# Patient Record
Sex: MALE | Race: WHITE | Employment: OTHER | URBAN - METROPOLITAN AREA
[De-identification: names, ages, dates, MRNs, and addresses within clinical notes are randomized per-mention and may not be internally consistent; named-entity substitution may affect disease eponyms.]

---

## 2019-03-21 ENCOUNTER — OFFICE VISIT (OUTPATIENT)
Dept: FAMILY MEDICINE CLINIC | Facility: CLINIC | Age: 69
End: 2019-03-21
Payer: MEDICARE

## 2019-03-21 VITALS
TEMPERATURE: 97 F | WEIGHT: 181 LBS | OXYGEN SATURATION: 98 % | BODY MASS INDEX: 29.09 KG/M2 | HEART RATE: 76 BPM | SYSTOLIC BLOOD PRESSURE: 124 MMHG | RESPIRATION RATE: 16 BRPM | DIASTOLIC BLOOD PRESSURE: 80 MMHG | HEIGHT: 66 IN

## 2019-03-21 DIAGNOSIS — R30.0 DYSURIA: Primary | ICD-10-CM

## 2019-03-21 LAB
APPEARANCE: CLEAR
BILIRUBIN: NEGATIVE
GLUCOSE (URINE DIPSTICK): NEGATIVE MG/DL
KETONES (URINE DIPSTICK): NEGATIVE MG/DL
LEUKOCYTES: NEGATIVE
MULTISTIX LOT#: NORMAL NUMERIC
NITRITE, URINE: NEGATIVE
OCCULT BLOOD: NEGATIVE
PH, URINE: 5.5 (ref 4.5–8)
PROTEIN (URINE DIPSTICK): NEGATIVE MG/DL
SPECIFIC GRAVITY: 1.01 (ref 1–1.03)
URINE-COLOR: YELLOW
UROBILINOGEN,SEMI-QN: 0.2 MG/DL (ref 0–1.9)

## 2019-03-21 PROCEDURE — 81003 URINALYSIS AUTO W/O SCOPE: CPT | Performed by: NURSE PRACTITIONER

## 2019-03-21 PROCEDURE — 87086 URINE CULTURE/COLONY COUNT: CPT | Performed by: NURSE PRACTITIONER

## 2019-03-21 PROCEDURE — 99203 OFFICE O/P NEW LOW 30 MIN: CPT | Performed by: NURSE PRACTITIONER

## 2019-03-21 RX ORDER — DILTIAZEM HYDROCHLORIDE EXTENDED-RELEASE TABLETS 180 MG/1
180 TABLET, EXTENDED RELEASE ORAL DAILY
COMMUNITY
End: 2020-02-27 | Stop reason: ALTCHOICE

## 2019-03-21 RX ORDER — FUROSEMIDE 20 MG/1
20 TABLET ORAL 2 TIMES DAILY
COMMUNITY

## 2019-03-21 RX ORDER — CIPROFLOXACIN 500 MG/1
500 TABLET, FILM COATED ORAL 2 TIMES DAILY
Qty: 20 TABLET | Refills: 0 | Status: SHIPPED | OUTPATIENT
Start: 2019-03-21 | End: 2019-03-31

## 2019-03-21 RX ORDER — DIGOXIN 125 MCG
TABLET ORAL DAILY
COMMUNITY
End: 2020-02-27 | Stop reason: ALTCHOICE

## 2019-03-21 NOTE — PROGRESS NOTES
CHIEF COMPLAINT:   Patient presents with:  Burning On Urination: freqeuncy sx x 3-4 days. HPI:   Michel Lyle is a 77year old male who presents with symptoms of UTI. Complaining of urinary frequency, urgency, dysuria for 4 days.   Symptoms have • Essential hypertension    • Hyperlipidemia       Social History:  Social History    Tobacco Use      Smoking status: Former Smoker      Smokeless tobacco: Never Used    Alcohol use: Not on file    Drug use: Not on file        REVIEW OF SYSTEMS:   GENERAL Sig: Take 1 tablet (500 mg total) by mouth 2 (two) times daily for 10 days. Risk and benefits of medication discussed. Stressed importance of completing full course of antibiotic unless told otherwise.      The patient indicates understanding of t · If you were given a prescription medicine, take as directed. Be sure to take it until it is all used up. · If a culture was taken, don't have sex until you have been told that it is negative. This means you don't have an infection.  Then follow your heal © 3294-7146 [employer] [address]. [facility] [place] [facility] [place]. All rights reserved. This information is not intended as a substitute for professional medical care. Always follow your healthcare professional's instructions.

## 2019-03-23 ENCOUNTER — TELEPHONE (OUTPATIENT)
Dept: FAMILY MEDICINE CLINIC | Facility: CLINIC | Age: 69
End: 2019-03-23

## 2019-03-23 NOTE — TELEPHONE ENCOUNTER
Spoke with patient after reviewing Final urine culture results, No growth after 24 hours. Pt instructed to stop rx and make appointment with PCP. Pt agrees to this plan.

## 2019-10-15 ENCOUNTER — WALK IN (OUTPATIENT)
Dept: URGENT CARE | Age: 69
End: 2019-10-15

## 2019-10-15 DIAGNOSIS — Z23 NEEDS FLU SHOT: Primary | ICD-10-CM

## 2019-10-15 PROCEDURE — G0008 ADMIN INFLUENZA VIRUS VAC: HCPCS | Performed by: NURSE PRACTITIONER

## 2019-10-15 PROCEDURE — 90662 IIV NO PRSV INCREASED AG IM: CPT | Performed by: NURSE PRACTITIONER

## 2024-06-26 ENCOUNTER — APPOINTMENT (OUTPATIENT)
Dept: GENERAL RADIOLOGY | Age: 74
End: 2024-06-26
Attending: PHYSICIAN ASSISTANT

## 2024-06-26 ENCOUNTER — HOSPITAL ENCOUNTER (EMERGENCY)
Age: 74
Discharge: HOME | End: 2024-06-26
Attending: EMERGENCY MEDICINE

## 2024-06-26 VITALS
BODY MASS INDEX: 34.53 KG/M2 | OXYGEN SATURATION: 97 % | TEMPERATURE: 98 F | HEIGHT: 67 IN | DIASTOLIC BLOOD PRESSURE: 85 MMHG | HEART RATE: 70 BPM | WEIGHT: 220 LBS | RESPIRATION RATE: 18 BRPM | SYSTOLIC BLOOD PRESSURE: 130 MMHG

## 2024-06-26 DIAGNOSIS — S61.213A LACERATION OF LEFT MIDDLE FINGER WITHOUT FOREIGN BODY WITHOUT DAMAGE TO NAIL, INITIAL ENCOUNTER: ICD-10-CM

## 2024-06-26 DIAGNOSIS — S61.311A LACERATION OF LEFT INDEX FINGER WITHOUT FOREIGN BODY WITH DAMAGE TO NAIL, INITIAL ENCOUNTER: Primary | ICD-10-CM

## 2024-06-26 PROCEDURE — 99283 EMERGENCY DEPT VISIT LOW MDM: CPT

## 2024-06-26 PROCEDURE — 12002 RPR S/N/AX/GEN/TRNK2.6-7.5CM: CPT

## 2024-06-26 PROCEDURE — 73130 X-RAY EXAM OF HAND: CPT | Performed by: PHYSICIAN ASSISTANT

## 2024-06-26 RX ORDER — DONEPEZIL HYDROCHLORIDE 10 MG/1
10 TABLET, FILM COATED ORAL
COMMUNITY
Start: 2023-07-05

## 2024-06-26 NOTE — ED PROVIDER NOTES
Patient Seen in: [facility] In [place]      History     Chief Complaint   Patient presents with    Laceration/Abrasion     Stated Complaint: l finger lac    Subjective:   HPI    Pleasant 73-year-old gentleman.  Right-hand-dominant.  Patient arrives to the ER for evaluation of a left hand laceration.  Occurred just prior to arrival while utilizing hedge tremors.  Sustained injury to the distal pad of the left index and middle finger.  Believes his tetanus is up-to-date.  He is anticoagulated.  Minimal bleeding.  Denies any difficulty with range of motion.    Objective:   Past Medical History:    Arthritis    Atherosclerosis of coronary artery    Atrial fibrillation (HCC)    Cardiac pacemaker in situ    Congestive heart disease (HCC)    Diverticulosis of sigmoid colon    History of hemorrhoids    PPH: 8/14/2002    Hx of colonic polyps    Tubular adenoma, ascending    Hyperlipidemia    Hypertension, essential              Past Surgical History:   Procedure Laterality Date    Cardiac pacemaker placement  08/2019    Carpal tunnel release Left 08/2011    Carpal tunnel release Right 02/2012    Colonoscopy  11/08/2006    Tubular adenoma, sigmoid    Colonoscopy  11/18/2009    Diverticulosis of sigmoid    Colonoscopy  10/05/2016    Tubular adenoma, ascending; diverticulosis of sigmoid    Colonoscopy  07/24/2020    Diverticulosis, sigmoid    Coronary stent placement  07/29/2011    Hemorrhoidectomy  08/14/2002    PPH: stapled hemorrhoidopexy    Inguinal hernia repair Left 06/04/2007    Large PerFix plug and patch    Knee replacement surgery Left 2005    Laparoscopic appendectomy      Other  foot                Social History     Socioeconomic History    Marital status:    Tobacco Use    Smoking status: Former    Smokeless tobacco: Never     Social Determinants of Health     Financial Resource Strain: Low Risk  (6/19/2024)    Received from [facility]    Overall Financial  Resource Strain (CARDIA)     Difficulty of Paying Living Expenses: Not hard at all   Food Insecurity: No Food Insecurity (6/19/2024)    Received from [facility]    Hunger Vital Sign     Worried About Running Out of Food in the Last Year: Never true     Ran Out of Food in the Last Year: Never true   Transportation Needs: No Transportation Needs (6/19/2024)    Received from [facility]    PRAPARE - Transportation     Lack of Transportation (Medical): No     Lack of Transportation (Non-Medical): No   Housing Stability: Low Risk  (6/19/2024)    Received from [facility]    Housing Stability Vital Sign     Unable to Pay for Housing in the Last Year: No     Number of Places Lived in the Last Year: 1     In the last 12 months, was there a time when you did not have a steady place to sleep or slept in a shelter (including now)?: No              Review of Systems    Positive for stated Chief Complaint: Laceration/Abrasion    Other systems are as noted in HPI.  Constitutional and vital signs reviewed.      All other systems reviewed and negative except as noted above.    Physical Exam     ED Triage Vitals [06/26/24 1350]   /85   Pulse 70   Resp 18   Temp 98 °F (36.7 °C)   Temp src    SpO2 97 %   O2 Device        Current Vitals:   Vital Signs  BP: 130/85  Pulse: 70  Resp: 18  Temp: 98 °F (36.7 °C)    Oxygen Therapy  SpO2: 97 %            Physical Exam    Gen: Well appearing, well groomed, alert and aware x 3  Lung: No distress, RR, no retraction  Extremities: Full ROM, no deformity, NVI  Skin: Laceration through the distal pad of the left index finger with nailbed involvement.  Lunula intact.  To the lateral aspect of the distal left middle finger there is a second laceration.  Superficial.  Roughly 1.5 cm.  Does not involve the nailbed  Neuro:  Normal Gait    ED Course   Labs Reviewed - No data to display        XR HAND (MIN 3 VIEWS), LEFT  (QWW=32357)    Result Date: 6/26/2024  PROCEDURE:  XR HAND (MIN 3 VIEWS), LEFT (CPT=73130)  TECHNIQUE:  Three views of the left hand were obtained.  COMPARISON:  None.  INDICATIONS:  l finger lac  PATIENT STATED HISTORY: (As transcribed by Technologist)  Patient has laceration to distal 2nd and 3rd digits on left hand due to  accident today.    FINDINGS:  Osseous structures appear demineralized.  Arthritic changes are seen at the carpal-first metacarpal joint with joint space narrowing, subchondral sclerosis and spurring.  Mild joint space narrowing noted in the PIP and DIP joints of the 2nd through 5th digits and the interphalangeal joint of the thumb.  There is moderate joint space narrowing of the 3rd MCP joint and the 5th MCP joint.  There is radiocarpal joint space narrowing also noted.  Soft tissue irregularity identified at the tip of the index finger.  There is no acute hand fracture, subluxation or dislocation.  No radiopaque foreign body is identified.            CONCLUSION:  No fracture or dislocation.  Soft tissue irregularity noted the tip of the index finger consistent with history of laceration.  Arthritic changes are seen in the hand and wrist.   LOCATION:  [facility]   Dictated by (CST): Rory Stiles MD on 6/26/2024 at 2:17 PM     Finalized by (CST): Rory Stiles MD on 6/26/2024 at 2:19 PM                 MDM        My supervising physician was involved in the management of this patient.    Skin: Laceration through the distal pad of the left index finger with nailbed involvement.  Lunula intact.  To the lateral aspect of the distal left middle finger there is a second laceration.  Superficial.  Roughly 1.5 cm.  Does not involve the nailbed    Concern for possible involvement of the tuft of the index finger.  Plain film x-rays performed.    Minimal depth to the middle finger injury    Verbal consent for the following procedure was obtained.  We discussed the inherent risks of  procedure.  We discussed benefits.  Patient agrees to proceed with the procedure and verbalizes understanding of potential complications.    Using 1% plain lidocaine, local injection performed to both of the index and middle finger.  Both sites were then thoroughly irrigated.  Sterile drape.  Site sterilized.      CONCLUSION:  No fracture or dislocation.  Soft tissue irregularity noted the tip of the index finger consistent with history of laceration.  Arthritic changes are seen in the hand and wrist.   LOCATION:  [facility]   Dictated by (CST): Rory Stiles MD on 6/26/2024 at 2:17 PM     Finalized by (CST): Rory Stiles MD on 6/26/2024 at 2:19 PM        X-ray as above.    To the index finger, using 5-0 nylon, 3 simple interrupted sutures were placed.  Well-approximated.  We discussed trimming the nail as it grows out    To the middle finger, using 5-0 nylon, 3 simple erupted sutures were placed.  Both lacerations 1.5 cm                           Medical Decision Making      Disposition and Plan     Clinical Impression:  1. Laceration of left index finger without foreign body with damage to nail, initial encounter    2. Laceration of left middle finger without foreign body without damage to nail, initial encounter         Disposition:  There is no disposition on file for this visit.  There is no disposition time on file for this visit.    Follow-up:  Cassandra Winter  [address] [place] [id number] [phone]    Follow up            Medications Prescribed:  Current Discharge Medication List

## 2024-08-20 ENCOUNTER — TELEPHONE (OUTPATIENT)
Dept: CARDIOLOGY UNIT | Facility: HOSPITAL | Age: 74
End: 2024-08-20

## 2024-08-20 NOTE — PROGRESS NOTES
Called patient to enquire about angiogram, he is having cath at [facility] on 8/26 w/Dr. Bethea, advised he obtain CD of images at the time of cath, he understands and agrees, will follow up after cath.  Venus Matthews RN  Clinical Coordinator  CV Surgery

## 2024-09-25 RX ORDER — EZETIMIBE 10 MG/1
10 TABLET ORAL NIGHTLY
COMMUNITY

## 2024-09-25 RX ORDER — ATORVASTATIN CALCIUM 80 MG/1
80 TABLET, FILM COATED ORAL NIGHTLY
COMMUNITY

## 2024-09-25 RX ORDER — FLUTICASONE PROPIONATE 50 MCG
1 SPRAY, SUSPENSION (ML) NASAL DAILY PRN
COMMUNITY

## 2024-09-25 RX ORDER — ROPINIROLE 0.5 MG/1
0.5 TABLET, FILM COATED ORAL NIGHTLY
COMMUNITY

## 2024-09-25 RX ORDER — FUROSEMIDE 20 MG/1
10 TABLET ORAL DAILY
COMMUNITY
End: 2024-10-12

## 2024-09-25 RX ORDER — TAMSULOSIN HYDROCHLORIDE 0.4 MG/1
0.4 CAPSULE ORAL NIGHTLY
COMMUNITY

## 2024-09-25 RX ORDER — METOPROLOL TARTRATE 50 MG
50 TABLET ORAL 2 TIMES DAILY
Status: ON HOLD | COMMUNITY
End: 2024-10-12

## 2024-09-25 RX ORDER — LEVOTHYROXINE SODIUM 100 UG/1
100 TABLET ORAL
COMMUNITY

## 2024-09-30 ENCOUNTER — HOSPITAL ENCOUNTER (OUTPATIENT)
Dept: CV DIAGNOSTICS | Facility: HOSPITAL | Age: 74
Discharge: HOME | End: 2024-09-30
Attending: THORACIC SURGERY (CARDIOTHORACIC VASCULAR SURGERY)
Payer: MEDICARE

## 2024-09-30 ENCOUNTER — HOSPITAL ENCOUNTER (OUTPATIENT)
Dept: LAB | Facility: HOSPITAL | Age: 74
Discharge: HOME | End: 2024-09-30
Attending: THORACIC SURGERY (CARDIOTHORACIC VASCULAR SURGERY)
Payer: MEDICARE

## 2024-09-30 ENCOUNTER — HOSPITAL ENCOUNTER (OUTPATIENT)
Dept: INTERVENTIONAL RADIOLOGY/VASCULAR | Facility: HOSPITAL | Age: 74
Discharge: HOME | End: 2024-09-30
Attending: THORACIC SURGERY (CARDIOTHORACIC VASCULAR SURGERY)
Payer: MEDICARE

## 2024-09-30 ENCOUNTER — HOSPITAL ENCOUNTER (OUTPATIENT)
Dept: CARDIOLOGY CLINIC | Facility: HOSPITAL | Age: 74
Discharge: HOME | End: 2024-09-30
Attending: THORACIC SURGERY (CARDIOTHORACIC VASCULAR SURGERY)
Payer: MEDICARE

## 2024-09-30 ENCOUNTER — HOSPITAL ENCOUNTER (OUTPATIENT)
Dept: GENERAL RADIOLOGY | Facility: HOSPITAL | Age: 74
Discharge: HOME | End: 2024-09-30
Attending: THORACIC SURGERY (CARDIOTHORACIC VASCULAR SURGERY)
Payer: MEDICARE

## 2024-09-30 DIAGNOSIS — Z01.818 PRE-OP TESTING: ICD-10-CM

## 2024-09-30 DIAGNOSIS — I48.91 ATRIAL FIBRILLATION, UNSPECIFIED TYPE (HCC): ICD-10-CM

## 2024-09-30 DIAGNOSIS — Z91.89 AT RISK FOR BLEEDING: ICD-10-CM

## 2024-09-30 DIAGNOSIS — I34.0 MITRAL REGURGITATION: ICD-10-CM

## 2024-09-30 LAB
ALBUMIN SERPL-MCNC: 4.1 G/DL (ref 3.2–4.8)
ALBUMIN/GLOB SERPL: 1.6 {RATIO} (ref 1–2)
ALP LIVER SERPL-CCNC: 72 U/L
ALT SERPL-CCNC: 33 U/L
ANION GAP SERPL CALC-SCNC: 4 MMOL/L (ref 0–18)
ANTIBODY SCREEN: NEGATIVE
APTT PPP: 33.4 SECONDS (ref 23–36)
AST SERPL-CCNC: 32 U/L (ref ?–34)
BASOPHILS # BLD AUTO: 0.01 X10(3) UL (ref 0–0.2)
BASOPHILS NFR BLD AUTO: 0.1 %
BILIRUB SERPL-MCNC: 1.7 MG/DL (ref 0.2–1.1)
BILIRUB UR QL STRIP.AUTO: NEGATIVE
BUN BLD-MCNC: 16 MG/DL (ref 9–23)
CALCIUM BLD-MCNC: 9.8 MG/DL (ref 8.7–10.4)
CHLORIDE SERPL-SCNC: 108 MMOL/L (ref 98–112)
CLARITY UR REFRACT.AUTO: CLEAR
CO2 SERPL-SCNC: 26 MMOL/L (ref 21–32)
COLOR UR AUTO: YELLOW
CREAT BLD-MCNC: 0.89 MG/DL
EGFRCR SERPLBLD CKD-EPI 2021: 89 ML/MIN/1.73M2 (ref 60–?)
EOSINOPHIL # BLD AUTO: 0.15 X10(3) UL (ref 0–0.7)
EOSINOPHIL NFR BLD AUTO: 2.2 %
ERYTHROCYTE [DISTWIDTH] IN BLOOD BY AUTOMATED COUNT: 13.6 %
EST. AVERAGE GLUCOSE BLD GHB EST-MCNC: 100 MG/DL (ref 68–126)
GLOBULIN PLAS-MCNC: 2.6 G/DL (ref 2–3.5)
GLUCOSE BLD-MCNC: 90 MG/DL (ref 70–99)
GLUCOSE UR STRIP.AUTO-MCNC: NORMAL MG/DL
HBA1C MFR BLD: 5.1 % (ref ?–5.7)
HCT VFR BLD AUTO: 46.7 %
HGB BLD-MCNC: 16.1 G/DL
IMM GRANULOCYTES # BLD AUTO: 0.01 X10(3) UL (ref 0–1)
IMM GRANULOCYTES NFR BLD: 0.1 %
INR BLD: 1.14 (ref 0.8–1.2)
KETONES UR STRIP.AUTO-MCNC: NEGATIVE MG/DL
LEUKOCYTE ESTERASE UR QL STRIP.AUTO: NEGATIVE
LYMPHOCYTES # BLD AUTO: 1.36 X10(3) UL (ref 1–4)
LYMPHOCYTES NFR BLD AUTO: 20.1 %
MCH RBC QN AUTO: 30.6 PG (ref 26–34)
MCHC RBC AUTO-ENTMCNC: 34.5 G/DL (ref 31–37)
MCV RBC AUTO: 88.8 FL
MONOCYTES # BLD AUTO: 0.61 X10(3) UL (ref 0.1–1)
MONOCYTES NFR BLD AUTO: 9 %
NEUTROPHILS # BLD AUTO: 4.62 X10 (3) UL (ref 1.5–7.7)
NEUTROPHILS # BLD AUTO: 4.62 X10(3) UL (ref 1.5–7.7)
NEUTROPHILS NFR BLD AUTO: 68.5 %
NITRITE UR QL STRIP.AUTO: NEGATIVE
OSMOLALITY SERPL CALC.SUM OF ELEC: 287 MOSM/KG (ref 275–295)
PH UR STRIP.AUTO: 5.5 [PH] (ref 5–8)
PLATELET # BLD AUTO: 204 10(3)UL (ref 150–450)
POTASSIUM SERPL-SCNC: 4.4 MMOL/L (ref 3.5–5.1)
PROT SERPL-MCNC: 6.7 G/DL (ref 5.7–8.2)
PROT UR STRIP.AUTO-MCNC: NEGATIVE MG/DL
PROTHROMBIN TIME: 14.8 SECONDS (ref 11.6–14.8)
RBC # BLD AUTO: 5.26 X10(6)UL
RBC UR QL AUTO: NEGATIVE
RH BLOOD TYPE: POSITIVE
SODIUM SERPL-SCNC: 138 MMOL/L (ref 136–145)
SP GR UR STRIP.AUTO: 1.02 (ref 1–1.03)
UROBILINOGEN UR STRIP.AUTO-MCNC: NORMAL MG/DL
WBC # BLD AUTO: 6.8 X10(3) UL (ref 4–11)

## 2024-09-30 PROCEDURE — 80053 COMPREHEN METABOLIC PANEL: CPT | Performed by: THORACIC SURGERY (CARDIOTHORACIC VASCULAR SURGERY)

## 2024-09-30 PROCEDURE — 86901 BLOOD TYPING SEROLOGIC RH(D): CPT | Performed by: THORACIC SURGERY (CARDIOTHORACIC VASCULAR SURGERY)

## 2024-09-30 PROCEDURE — 85025 COMPLETE CBC W/AUTO DIFF WBC: CPT | Performed by: THORACIC SURGERY (CARDIOTHORACIC VASCULAR SURGERY)

## 2024-09-30 PROCEDURE — 93010 ELECTROCARDIOGRAM REPORT: CPT | Performed by: INTERNAL MEDICINE

## 2024-09-30 PROCEDURE — 71045 X-RAY EXAM CHEST 1 VIEW: CPT | Performed by: THORACIC SURGERY (CARDIOTHORACIC VASCULAR SURGERY)

## 2024-09-30 PROCEDURE — 85730 THROMBOPLASTIN TIME PARTIAL: CPT | Performed by: THORACIC SURGERY (CARDIOTHORACIC VASCULAR SURGERY)

## 2024-09-30 PROCEDURE — 36415 COLL VENOUS BLD VENIPUNCTURE: CPT | Performed by: THORACIC SURGERY (CARDIOTHORACIC VASCULAR SURGERY)

## 2024-09-30 PROCEDURE — 86900 BLOOD TYPING SEROLOGIC ABO: CPT | Performed by: THORACIC SURGERY (CARDIOTHORACIC VASCULAR SURGERY)

## 2024-09-30 PROCEDURE — 81003 URINALYSIS AUTO W/O SCOPE: CPT | Performed by: THORACIC SURGERY (CARDIOTHORACIC VASCULAR SURGERY)

## 2024-09-30 PROCEDURE — 85610 PROTHROMBIN TIME: CPT | Performed by: THORACIC SURGERY (CARDIOTHORACIC VASCULAR SURGERY)

## 2024-09-30 PROCEDURE — 93005 ELECTROCARDIOGRAM TRACING: CPT

## 2024-09-30 PROCEDURE — 86850 RBC ANTIBODY SCREEN: CPT | Performed by: THORACIC SURGERY (CARDIOTHORACIC VASCULAR SURGERY)

## 2024-09-30 PROCEDURE — 83036 HEMOGLOBIN GLYCOSYLATED A1C: CPT | Performed by: THORACIC SURGERY (CARDIOTHORACIC VASCULAR SURGERY)

## 2024-09-30 NOTE — PAT NURSING NOTE
Met with patient in structural heart for PAT visit for upcoming MVR with Dr. Porras on 10/8     PATs completed at visit. Pre surgical instructions reviewed with patient. Tenative time of arrival for surgery 0530, will call patient day before to confirm arrival time. [place] at [facility] registration desk. Park in [place] parking garage. Bring surgical binder. Showering with hibiclens x3, no lotions, powders, ointments. Do not shave below neck. No jewelry. Strict NPO at 2200 night before. Morning of surgery, take only metoprolol with small sip of water. No gum, candy, food, mints, coffee, liquids, etc. STOP all over the counter vitamins, supplements, nsaids, omega 3 one week prior to surgery, take last dose on 9/30. Take last dose of Eliquis on 10/2. Admit average of 5 nights. Intra-op and post op expectations discussed. Roles of pt/ot discussed. Patient and spouse verbalized understanding of all instructions.    Patient has [employer] pacemaker in place.     5 meter walk times     3.89  3.96  3.75

## 2024-09-30 NOTE — PROGRESS NOTES
Met with patient and wife in PAT to discuss pre op teaching, post op expectations, discharge planning.  Discussed roles of CM/SW, PT/OT, Cardiac rehab in education and recovery.  All questions answered, binder given.  Discussed typical post op and at home recovery as well as restrictions, wife will be home to assist, she is a former RN and once worked with Dr. Porras. Cath films received, will review with Dr. Porras.  Pt has chosen TISSUE [facility]VE.  Will follow up post op.  Venus Matthews RN  Clinical Coordinator  CV Surgery

## 2024-09-30 NOTE — CM/SW NOTE
Met with patient per protocol as patient is scheduled to have MVR on 10/8/2024 with Dr Porras to discuss discharge planning.  Patient, who goes by Dell, has been retired from [employer] as a  for the past 20 years.  He resides with his spouse Rupa in a ranch style home in [place] in the [facility].  The couple have been  for 50 years and have 3 grown children who all reside 'locally.'  Dell drives, is independent with ADL's --uses no assistive devices or respiratory DME.  He has not smoked for over 38 years.  As for interests and hobbies, he   09/30/24 1400   CM/SW Referral Data   Referral Source Physician;   Reason for Referral Discharge planning;Protocol order set  (MVR with kimberly on 10/8/2024)   Specify order set Other  (mvr)   Informant Patient;Spouse/Significant Other   Medical Hx   Does patient have an established PCP? Yes   Patient Info   Patient's Current Mental Status at Time of Assessment Alert;Oriented   Patient's Home Environment House   Number of Levels in Home 1   Number of Stair in Home none--ranch   Patient lives with Spouse/Significant other   Patient Status Prior to Admission   Independent with ADLs and Mobility Yes   Discharge Needs   Anticipated D/C needs Home health care      enjoys fishing, playing pool, [employer] ball and playing cards.  PCP is Dr Julio Bueno--[facility]-Dell served in the [employer] [place] for 19 months--has 100% disability benefits; otherwise uses the [facility] for one time prescriptions.      Discussed what to expect day of / post surgery.  Explained how therapies to work with the patient and customize a discharge plan for him.  Talked about the role of HHC @ discharge--either [facility] to be arranged within patient's insurance network--both in agreement of this service being arranged.  Spouse Rupa is a retired 'seasoned' nurse with most recent position as a Director of Nursing @ [employer]--retired just  before COVID.; she will be home with patient so he will not be alone.  All questions addressed / answered.  Emotional support given.  CM/SW to remain available for any additional discharge planning needs

## 2024-10-01 ENCOUNTER — TELEPHONE (OUTPATIENT)
Dept: CARDIOLOGY UNIT | Facility: HOSPITAL | Age: 74
End: 2024-10-01

## 2024-10-01 LAB
ATRIAL RATE: 241 BPM
Q-T INTERVAL: 442 MS
QRS DURATION: 148 MS
QTC CALCULATION (BEZET): 477 MS
R AXIS: -54 DEGREES
T AXIS: 38 DEGREES
VENTRICULAR RATE: 70 BPM

## 2024-10-01 NOTE — PROGRESS NOTES
Reviewed cath films with Dr. Porras, no significant CAD.  Venus Matthews RN  Clinical Coordinator  CV Surgery

## 2024-10-07 NOTE — DISCHARGE INSTRUCTIONS
To access the Dr. Porras discharge instructions video on your home computer:   [email or web address]  Remove steri strips from all incisions on 10/22      Sometimes managing your health at home requires assistance.  [facility] team has recognized your preference to use [facility].  They can be reached by phone at [phone].  The fax number for your reference is [phone].  A representative from the home health agency will contact you or your family to schedule your first visit.     [facility] @ discharge  [phone]    Your orientation appointment for cardiac rehabilitation is 11/7 at 10 am.    Please bring an order for cardiac rehabilitation from your cardiologist.    Please verify your insurance coverage and request an HMO referral if needed prior to your first appointment.    Your appointment will be one hour long.    If you have questions about cardiac rehabilitation, please call [phone].

## 2024-10-08 ENCOUNTER — APPOINTMENT (OUTPATIENT)
Dept: GENERAL RADIOLOGY | Facility: HOSPITAL | Age: 74
End: 2024-10-08
Attending: PHYSICIAN ASSISTANT
Payer: MEDICARE

## 2024-10-08 ENCOUNTER — ANESTHESIA (OUTPATIENT)
Dept: CARDIAC SURGERY | Facility: HOSPITAL | Age: 74
End: 2024-10-08
Payer: MEDICARE

## 2024-10-08 ENCOUNTER — ANESTHESIA EVENT (OUTPATIENT)
Dept: CARDIAC SURGERY | Facility: HOSPITAL | Age: 74
End: 2024-10-08
Payer: MEDICARE

## 2024-10-08 ENCOUNTER — HOSPITAL ENCOUNTER (INPATIENT)
Facility: HOSPITAL | Age: 74
LOS: 4 days | Discharge: HOME HEALTH | End: 2024-10-12
Attending: THORACIC SURGERY (CARDIOTHORACIC VASCULAR SURGERY) | Admitting: THORACIC SURGERY (CARDIOTHORACIC VASCULAR SURGERY)
Payer: MEDICARE

## 2024-10-08 ENCOUNTER — HOSPITAL ENCOUNTER (INPATIENT)
Facility: HOSPITAL | Age: 74
LOS: 4 days | Discharge: HOME | End: 2024-10-12
Attending: THORACIC SURGERY (CARDIOTHORACIC VASCULAR SURGERY) | Admitting: THORACIC SURGERY (CARDIOTHORACIC VASCULAR SURGERY)
Payer: MEDICARE

## 2024-10-08 DIAGNOSIS — I34.0 MITRAL REGURGITATION: Primary | ICD-10-CM

## 2024-10-08 DIAGNOSIS — I48.91 ATRIAL FIBRILLATION, UNSPECIFIED TYPE (HCC): ICD-10-CM

## 2024-10-08 DIAGNOSIS — J90 PLEURAL EFFUSION: ICD-10-CM

## 2024-10-08 DIAGNOSIS — Z91.89 AT RISK FOR BLEEDING: ICD-10-CM

## 2024-10-08 DIAGNOSIS — Z01.818 PRE-OP TESTING: ICD-10-CM

## 2024-10-08 LAB
APTT PPP: 41 SECONDS (ref 23–36)
ATRIAL RATE: 71 BPM
BASE EXCESS BLD CALC-SCNC: -3 MMOL/L
BASE EXCESS BLDA CALC-SCNC: -1.1 MMOL/L (ref ?–2)
BASE EXCESS BLDA CALC-SCNC: -3.8 MMOL/L (ref ?–2)
BODY TEMPERATURE: 98.6 F
BODY TEMPERATURE: 98.6 F
BUN BLD-MCNC: 13 MG/DL (ref 9–23)
CA-I BLD-SCNC: 1.12 MMOL/L (ref 0.95–1.32)
CA-I BLD-SCNC: 1.2 MMOL/L (ref 1.12–1.32)
CA-I BLD-SCNC: 1.3 MMOL/L (ref 0.95–1.32)
CALCIUM BLD-MCNC: 7.9 MG/DL (ref 8.7–10.4)
CHLORIDE SERPL-SCNC: 115 MMOL/L (ref 98–112)
CO2 BLD-SCNC: 25 MMOL/L (ref 22–32)
CO2 SERPL-SCNC: 24 MMOL/L (ref 21–32)
COHGB MFR BLD: 1.5 % SAT (ref 0–3)
COHGB MFR BLD: 1.8 % SAT (ref 0–3)
CREAT BLD-MCNC: 0.79 MG/DL
EGFRCR SERPLBLD CKD-EPI 2021: 92 ML/MIN/1.73M2 (ref 60–?)
ERYTHROCYTE [DISTWIDTH] IN BLOOD BY AUTOMATED COUNT: 13.4 %
FIBRINOGEN PPP-MCNC: 207 MG/DL (ref 200–480)
FIO2: 40 %
FIO2: 60 %
GLUCOSE BLD-MCNC: 117 MG/DL (ref 70–99)
GLUCOSE BLD-MCNC: 128 MG/DL (ref 70–99)
GLUCOSE BLD-MCNC: 129 MG/DL (ref 70–99)
GLUCOSE BLD-MCNC: 132 MG/DL (ref 70–99)
GLUCOSE BLD-MCNC: 132 MG/DL (ref 70–99)
GLUCOSE BLD-MCNC: 133 MG/DL (ref 70–99)
GLUCOSE BLD-MCNC: 136 MG/DL (ref 70–99)
GLUCOSE BLD-MCNC: 137 MG/DL (ref 70–99)
GLUCOSE BLD-MCNC: 142 MG/DL (ref 70–99)
GLUCOSE BLD-MCNC: 143 MG/DL (ref 70–99)
GLUCOSE BLD-MCNC: 144 MG/DL (ref 70–99)
GLUCOSE BLD-MCNC: 145 MG/DL (ref 70–99)
GLUCOSE BLD-MCNC: 147 MG/DL (ref 70–99)
HCO3 BLD-SCNC: 23.5 MEQ/L
HCO3 BLDA-SCNC: 22 MEQ/L (ref 21–27)
HCO3 BLDA-SCNC: 24.1 MEQ/L (ref 21–27)
HCT VFR BLD AUTO: 42.8 %
HCT VFR BLD CALC: 38 %
HGB BLD-MCNC: 12.3 G/DL
HGB BLD-MCNC: 13.7 G/DL
HGB BLD-MCNC: 14.7 G/DL
INR BLD: 1.5 (ref 0.8–1.2)
ISTAT ACTIVATED CLOTTING TIME: 140 SECONDS (ref 74–137)
LACTATE BLD-SCNC: 1 MMOL/L (ref 0.5–2)
LACTATE BLD-SCNC: 1.7 MMOL/L (ref 0.5–2)
MAGNESIUM SERPL-MCNC: 2.2 MG/DL (ref 1.6–2.6)
MCH RBC QN AUTO: 31.1 PG (ref 26–34)
MCHC RBC AUTO-ENTMCNC: 34.3 G/DL (ref 31–37)
MCV RBC AUTO: 90.5 FL
METHGB MFR BLD: 0.6 % SAT (ref 0.4–1.5)
METHGB MFR BLD: 0.6 % SAT (ref 0.4–1.5)
MRSA DNA SPEC QL NAA+PROBE: NEGATIVE
OXYHGB MFR BLDA: 97.6 % (ref 92–100)
OXYHGB MFR BLDA: 97.8 % (ref 92–100)
P AXIS: 74 DEGREES
PCO2 BLD: 46.7 MMHG
PCO2 BLDA: 26 MM HG (ref 35–45)
PCO2 BLDA: 39 MM HG (ref 35–45)
PEEP: 5 CM H2O
PEEP: 5 CM H2O
PH BLD: 7.31 [PH]
PH BLDA: 7.35 [PH] (ref 7.35–7.45)
PH BLDA: 7.51 [PH] (ref 7.35–7.45)
PLATELET # BLD AUTO: 116 10(3)UL (ref 150–450)
PO2 BLD: 197 MMHG
PO2 BLDA: 164 MM HG (ref 80–100)
PO2 BLDA: 218 MM HG (ref 80–100)
POTASSIUM BLD-SCNC: 3.6 MMOL/L (ref 3.6–5.1)
POTASSIUM BLD-SCNC: 3.6 MMOL/L (ref 3.6–5.1)
POTASSIUM BLD-SCNC: 4.3 MMOL/L (ref 3.6–5.1)
POTASSIUM SERPL-SCNC: 4.4 MMOL/L (ref 3.5–5.1)
PRESSURE SUPPORT: 5 CM H2O
PROTHROMBIN TIME: 18.3 SECONDS (ref 11.6–14.8)
Q-T INTERVAL: 486 MS
QRS DURATION: 148 MS
QTC CALCULATION (BEZET): 535 MS
R AXIS: 251 DEGREES
RBC # BLD AUTO: 4.73 X10(6)UL
RH BLOOD TYPE: POSITIVE
SAO2 % BLD: 100 %
SODIUM BLD-SCNC: 135 MMOL/L (ref 135–145)
SODIUM BLD-SCNC: 140 MMOL/L (ref 135–145)
SODIUM BLD-SCNC: 143 MMOL/L (ref 136–145)
SODIUM SERPL-SCNC: 144 MMOL/L (ref 136–145)
T AXIS: 74 DEGREES
TIDAL VOLUME: 500 ML
VENT RATE: 10 /MIN
VENTRICULAR RATE: 73 BPM
WBC # BLD AUTO: 11.2 X10(3) UL (ref 4–11)

## 2024-10-08 PROCEDURE — 02RG08Z REPLACEMENT OF MITRAL VALVE WITH ZOOPLASTIC TISSUE, OPEN APPROACH: ICD-10-PCS | Performed by: THORACIC SURGERY (CARDIOTHORACIC VASCULAR SURGERY)

## 2024-10-08 PROCEDURE — 5A1221Z PERFORMANCE OF CARDIAC OUTPUT, CONTINUOUS: ICD-10-PCS | Performed by: THORACIC SURGERY (CARDIOTHORACIC VASCULAR SURGERY)

## 2024-10-08 PROCEDURE — 93312 ECHO TRANSESOPHAGEAL: CPT | Performed by: INTERNAL MEDICINE

## 2024-10-08 PROCEDURE — P9045 ALBUMIN (HUMAN), 5%, 250 ML: HCPCS | Performed by: INTERNAL MEDICINE

## 2024-10-08 PROCEDURE — 02580ZZ DESTRUCTION OF CONDUCTION MECHANISM, OPEN APPROACH: ICD-10-PCS | Performed by: THORACIC SURGERY (CARDIOTHORACIC VASCULAR SURGERY)

## 2024-10-08 PROCEDURE — 71045 X-RAY EXAM CHEST 1 VIEW: CPT | Performed by: PHYSICIAN ASSISTANT

## 2024-10-08 PROCEDURE — S0028 INJECTION, FAMOTIDINE, 20 MG: HCPCS | Performed by: INTERNAL MEDICINE

## 2024-10-08 PROCEDURE — 99223 1ST HOSP IP/OBS HIGH 75: CPT | Performed by: INTERNAL MEDICINE

## 2024-10-08 PROCEDURE — 02B70ZK EXCISION OF LEFT ATRIAL APPENDAGE, OPEN APPROACH: ICD-10-PCS | Performed by: THORACIC SURGERY (CARDIOTHORACIC VASCULAR SURGERY)

## 2024-10-08 DEVICE — STERNALPLATE, X: Type: IMPLANTABLE DEVICE | Status: FUNCTIONAL

## 2024-10-08 DEVICE — CARPENTIER-EDWARDS PERIMOUNT MAGNA MITRAL EASE PERICARDIAL BIOPROSTHESIS
Type: IMPLANTABLE DEVICE | Status: FUNCTIONAL
Brand: CARPENTIER-EDWARDS PERIMOUNT MAGNA MITRAL EASE PERICARDIAL BIOPROSTHESIS

## 2024-10-08 DEVICE — LOCKING SCREW,AXS,SELF-DRILLING
Type: IMPLANTABLE DEVICE | Status: FUNCTIONAL
Brand: AXS, SMARTLOCK

## 2024-10-08 RX ORDER — DEXMEDETOMIDINE HYDROCHLORIDE 4 UG/ML
INJECTION, SOLUTION INTRAVENOUS CONTINUOUS PRN
Status: DISCONTINUED | OUTPATIENT
Start: 2024-10-08 | End: 2024-10-08 | Stop reason: SURG

## 2024-10-08 RX ORDER — ATORVASTATIN CALCIUM 80 MG/1
80 TABLET, FILM COATED ORAL NIGHTLY
Status: DISCONTINUED | OUTPATIENT
Start: 2024-10-08 | End: 2024-10-12

## 2024-10-08 RX ORDER — FAMOTIDINE 10 MG/ML
INJECTION, SOLUTION INTRAVENOUS AS NEEDED
Status: DISCONTINUED | OUTPATIENT
Start: 2024-10-08 | End: 2024-10-08 | Stop reason: SURG

## 2024-10-08 RX ORDER — ALBUMIN, HUMAN INJ 5% 5 %
SOLUTION INTRAVENOUS CONTINUOUS PRN
Status: DISCONTINUED | OUTPATIENT
Start: 2024-10-08 | End: 2024-10-08 | Stop reason: SURG

## 2024-10-08 RX ORDER — PHENYLEPHRINE HCL 10 MG/ML
VIAL (ML) INJECTION AS NEEDED
Status: DISCONTINUED | OUTPATIENT
Start: 2024-10-08 | End: 2024-10-08 | Stop reason: SURG

## 2024-10-08 RX ORDER — POTASSIUM CHLORIDE 14.9 MG/ML
20 INJECTION INTRAVENOUS AS NEEDED
Status: DISCONTINUED | OUTPATIENT
Start: 2024-10-08 | End: 2024-10-12

## 2024-10-08 RX ORDER — DIPHENHYDRAMINE HYDROCHLORIDE 50 MG/ML
12.5 INJECTION INTRAMUSCULAR; INTRAVENOUS EVERY 4 HOURS PRN
Status: DISCONTINUED | OUTPATIENT
Start: 2024-10-08 | End: 2024-10-09

## 2024-10-08 RX ORDER — MIDAZOLAM HYDROCHLORIDE 1 MG/ML
1 INJECTION INTRAMUSCULAR; INTRAVENOUS EVERY 30 MIN PRN
Status: DISCONTINUED | OUTPATIENT
Start: 2024-10-08 | End: 2024-10-09

## 2024-10-08 RX ORDER — DEXTROSE MONOHYDRATE 25 G/50ML
50 INJECTION, SOLUTION INTRAVENOUS
Status: DISCONTINUED | OUTPATIENT
Start: 2024-10-08 | End: 2024-10-09

## 2024-10-08 RX ORDER — SODIUM CHLORIDE 9 MG/ML
INJECTION, SOLUTION INTRAVENOUS CONTINUOUS
Status: DISCONTINUED | OUTPATIENT
Start: 2024-10-08 | End: 2024-10-12

## 2024-10-08 RX ORDER — VANCOMYCIN HYDROCHLORIDE
15 EVERY 12 HOURS
Status: COMPLETED | OUTPATIENT
Start: 2024-10-08 | End: 2024-10-09

## 2024-10-08 RX ORDER — EZETIMIBE 10 MG/1
10 TABLET ORAL NIGHTLY
Status: DISCONTINUED | OUTPATIENT
Start: 2024-10-08 | End: 2024-10-12

## 2024-10-08 RX ORDER — DONEPEZIL HYDROCHLORIDE 5 MG/1
10 TABLET, FILM COATED ORAL NIGHTLY
Status: DISCONTINUED | OUTPATIENT
Start: 2024-10-08 | End: 2024-10-12

## 2024-10-08 RX ORDER — HEPARIN SODIUM 1000 [USP'U]/ML
INJECTION, SOLUTION INTRAVENOUS; SUBCUTANEOUS AS NEEDED
Status: DISCONTINUED | OUTPATIENT
Start: 2024-10-08 | End: 2024-10-08 | Stop reason: SURG

## 2024-10-08 RX ORDER — IPRATROPIUM BROMIDE AND ALBUTEROL SULFATE 2.5; .5 MG/3ML; MG/3ML
3 SOLUTION RESPIRATORY (INHALATION) EVERY 4 HOURS PRN
Status: DISCONTINUED | OUTPATIENT
Start: 2024-10-08 | End: 2024-10-09

## 2024-10-08 RX ORDER — POLYETHYLENE GLYCOL 3350 17 G/17G
17 POWDER, FOR SOLUTION ORAL DAILY PRN
Status: DISCONTINUED | OUTPATIENT
Start: 2024-10-08 | End: 2024-10-12

## 2024-10-08 RX ORDER — ASPIRIN 81 MG/1
81 TABLET ORAL DAILY
Status: DISCONTINUED | OUTPATIENT
Start: 2024-10-09 | End: 2024-10-12

## 2024-10-08 RX ORDER — MORPHINE SULFATE 4 MG/ML
4 INJECTION, SOLUTION INTRAMUSCULAR; INTRAVENOUS EVERY 2 HOUR PRN
Status: DISCONTINUED | OUTPATIENT
Start: 2024-10-08 | End: 2024-10-12

## 2024-10-08 RX ORDER — ACETAMINOPHEN 10 MG/ML
1000 INJECTION, SOLUTION INTRAVENOUS EVERY 6 HOURS PRN
Status: DISCONTINUED | OUTPATIENT
Start: 2024-10-08 | End: 2024-10-09

## 2024-10-08 RX ORDER — DOBUTAMINE HYDROCHLORIDE 200 MG/100ML
INJECTION INTRAVENOUS CONTINUOUS PRN
Status: DISCONTINUED | OUTPATIENT
Start: 2024-10-08 | End: 2024-10-12

## 2024-10-08 RX ORDER — ALBUMIN, HUMAN INJ 5% 5 %
12.5 SOLUTION INTRAVENOUS ONCE
Status: COMPLETED | OUTPATIENT
Start: 2024-10-08 | End: 2024-10-08

## 2024-10-08 RX ORDER — TAMSULOSIN HYDROCHLORIDE 0.4 MG/1
0.4 CAPSULE ORAL NIGHTLY
Status: DISCONTINUED | OUTPATIENT
Start: 2024-10-08 | End: 2024-10-12

## 2024-10-08 RX ORDER — PROTAMINE SULFATE 10 MG/ML
INJECTION, SOLUTION INTRAVENOUS AS NEEDED
Status: DISCONTINUED | OUTPATIENT
Start: 2024-10-08 | End: 2024-10-08 | Stop reason: SURG

## 2024-10-08 RX ORDER — MIDAZOLAM HYDROCHLORIDE 1 MG/ML
INJECTION INTRAMUSCULAR; INTRAVENOUS AS NEEDED
Status: DISCONTINUED | OUTPATIENT
Start: 2024-10-08 | End: 2024-10-08 | Stop reason: SURG

## 2024-10-08 RX ORDER — LIDOCAINE HYDROCHLORIDE 10 MG/ML
INJECTION, SOLUTION EPIDURAL; INFILTRATION; INTRACAUDAL; PERINEURAL AS NEEDED
Status: DISCONTINUED | OUTPATIENT
Start: 2024-10-08 | End: 2024-10-08 | Stop reason: SURG

## 2024-10-08 RX ORDER — DEXMEDETOMIDINE HYDROCHLORIDE 4 UG/ML
INJECTION, SOLUTION INTRAVENOUS CONTINUOUS
Status: DISCONTINUED | OUTPATIENT
Start: 2024-10-08 | End: 2024-10-09

## 2024-10-08 RX ORDER — LEVOTHYROXINE SODIUM 100 UG/1
100 TABLET ORAL
Status: DISCONTINUED | OUTPATIENT
Start: 2024-10-08 | End: 2024-10-12

## 2024-10-08 RX ORDER — BISACODYL 10 MG
10 SUPPOSITORY, RECTAL RECTAL
Status: DISCONTINUED | OUTPATIENT
Start: 2024-10-08 | End: 2024-10-12

## 2024-10-08 RX ORDER — VANCOMYCIN HYDROCHLORIDE 1 G/20ML
INJECTION, POWDER, LYOPHILIZED, FOR SOLUTION INTRAVENOUS AS NEEDED
Status: DISCONTINUED | OUTPATIENT
Start: 2024-10-08 | End: 2024-10-08 | Stop reason: SURG

## 2024-10-08 RX ORDER — ROPINIROLE 0.5 MG/1
0.5 TABLET, FILM COATED ORAL NIGHTLY
Status: DISCONTINUED | OUTPATIENT
Start: 2024-10-08 | End: 2024-10-12

## 2024-10-08 RX ORDER — SODIUM CHLORIDE 9 MG/ML
INJECTION, SOLUTION INTRAVENOUS CONTINUOUS PRN
Status: DISCONTINUED | OUTPATIENT
Start: 2024-10-08 | End: 2024-10-08 | Stop reason: SURG

## 2024-10-08 RX ORDER — CHLORHEXIDINE GLUCONATE ORAL RINSE 1.2 MG/ML
15 SOLUTION DENTAL
Status: DISCONTINUED | OUTPATIENT
Start: 2024-10-08 | End: 2024-10-09

## 2024-10-08 RX ORDER — MELATONIN
3 NIGHTLY PRN
Status: DISCONTINUED | OUTPATIENT
Start: 2024-10-08 | End: 2024-10-12

## 2024-10-08 RX ORDER — MORPHINE SULFATE 2 MG/ML
2 INJECTION, SOLUTION INTRAMUSCULAR; INTRAVENOUS EVERY 2 HOUR PRN
Status: DISCONTINUED | OUTPATIENT
Start: 2024-10-08 | End: 2024-10-12

## 2024-10-08 RX ORDER — DEXTROSE MONOHYDRATE AND SODIUM CHLORIDE 5; .45 G/100ML; G/100ML
INJECTION, SOLUTION INTRAVENOUS CONTINUOUS
Status: DISCONTINUED | OUTPATIENT
Start: 2024-10-08 | End: 2024-10-12

## 2024-10-08 RX ORDER — ROCURONIUM BROMIDE 10 MG/ML
INJECTION, SOLUTION INTRAVENOUS AS NEEDED
Status: DISCONTINUED | OUTPATIENT
Start: 2024-10-08 | End: 2024-10-08 | Stop reason: SURG

## 2024-10-08 RX ORDER — NITROGLYCERIN 20 MG/100ML
INJECTION INTRAVENOUS CONTINUOUS PRN
Status: DISCONTINUED | OUTPATIENT
Start: 2024-10-08 | End: 2024-10-12

## 2024-10-08 RX ORDER — SODIUM CHLORIDE 9 MG/ML
INJECTION, SOLUTION INTRAVENOUS CONTINUOUS
Status: DISCONTINUED | OUTPATIENT
Start: 2024-10-08 | End: 2024-10-09

## 2024-10-08 RX ORDER — DOCUSATE SODIUM 100 MG/1
100 CAPSULE, LIQUID FILLED ORAL 2 TIMES DAILY
Status: DISCONTINUED | OUTPATIENT
Start: 2024-10-08 | End: 2024-10-12

## 2024-10-08 RX ORDER — POTASSIUM CHLORIDE 29.8 MG/ML
40 INJECTION INTRAVENOUS AS NEEDED
Status: DISCONTINUED | OUTPATIENT
Start: 2024-10-08 | End: 2024-10-12

## 2024-10-08 RX ORDER — NICOTINE POLACRILEX 4 MG
15 LOZENGE BUCCAL
Status: DISCONTINUED | OUTPATIENT
Start: 2024-10-08 | End: 2024-10-09

## 2024-10-08 RX ORDER — NALOXONE HYDROCHLORIDE 0.4 MG/ML
0.08 INJECTION, SOLUTION INTRAMUSCULAR; INTRAVENOUS; SUBCUTANEOUS
Status: DISCONTINUED | OUTPATIENT
Start: 2024-10-08 | End: 2024-10-09

## 2024-10-08 RX ORDER — SENNOSIDES 8.6 MG
8.6 TABLET ORAL 2 TIMES DAILY
Status: DISCONTINUED | OUTPATIENT
Start: 2024-10-08 | End: 2024-10-12

## 2024-10-08 RX ORDER — PANTOPRAZOLE SODIUM 40 MG/1
40 TABLET, DELAYED RELEASE ORAL
Status: DISCONTINUED | OUTPATIENT
Start: 2024-10-08 | End: 2024-10-12

## 2024-10-08 RX ORDER — DOBUTAMINE HYDROCHLORIDE 200 MG/100ML
INJECTION INTRAVENOUS CONTINUOUS PRN
Status: DISCONTINUED | OUTPATIENT
Start: 2024-10-08 | End: 2024-10-08 | Stop reason: SURG

## 2024-10-08 RX ORDER — DIPHENHYDRAMINE HYDROCHLORIDE 50 MG/ML
INJECTION INTRAMUSCULAR; INTRAVENOUS AS NEEDED
Status: DISCONTINUED | OUTPATIENT
Start: 2024-10-08 | End: 2024-10-08 | Stop reason: SURG

## 2024-10-08 RX ORDER — NICOTINE POLACRILEX 4 MG
30 LOZENGE BUCCAL
Status: DISCONTINUED | OUTPATIENT
Start: 2024-10-08 | End: 2024-10-09

## 2024-10-08 RX ORDER — MAGNESIUM SULFATE HEPTAHYDRATE 40 MG/ML
2 INJECTION, SOLUTION INTRAVENOUS AS NEEDED
Status: DISCONTINUED | OUTPATIENT
Start: 2024-10-08 | End: 2024-10-12

## 2024-10-08 RX ORDER — ALBUMIN, HUMAN INJ 5% 5 %
12.5 SOLUTION INTRAVENOUS ONCE AS NEEDED
Status: DISPENSED | OUTPATIENT
Start: 2024-10-08 | End: 2024-10-09

## 2024-10-08 RX ORDER — MAGNESIUM SULFATE 1 G/100ML
1 INJECTION INTRAVENOUS AS NEEDED
Status: DISCONTINUED | OUTPATIENT
Start: 2024-10-08 | End: 2024-10-12

## 2024-10-08 RX ORDER — ONDANSETRON 2 MG/ML
4 INJECTION INTRAMUSCULAR; INTRAVENOUS EVERY 6 HOURS PRN
Status: DISCONTINUED | OUTPATIENT
Start: 2024-10-08 | End: 2024-10-12

## 2024-10-08 RX ADMIN — PHENYLEPHRINE HCL 100 MCG: 10 MG/ML VIAL (ML) INJECTION at 07:52:00

## 2024-10-08 RX ADMIN — ROCURONIUM BROMIDE 100 MG: 10 INJECTION, SOLUTION INTRAVENOUS at 06:46:00

## 2024-10-08 RX ADMIN — LIDOCAINE HYDROCHLORIDE 50 MG: 10 INJECTION, SOLUTION EPIDURAL; INFILTRATION; INTRACAUDAL; PERINEURAL at 06:46:00

## 2024-10-08 RX ADMIN — VANCOMYCIN HYDROCHLORIDE 1000 MG: 1 INJECTION, POWDER, LYOPHILIZED, FOR SOLUTION INTRAVENOUS at 07:30:00

## 2024-10-08 RX ADMIN — DOBUTAMINE HYDROCHLORIDE 3 MCG/KG/MIN: 200 INJECTION INTRAVENOUS at 10:00:00

## 2024-10-08 RX ADMIN — HEPARIN SODIUM 25000 UNITS: 1000 INJECTION, SOLUTION INTRAVENOUS; SUBCUTANEOUS at 07:55:00

## 2024-10-08 RX ADMIN — ROCURONIUM BROMIDE 50 MG: 10 INJECTION, SOLUTION INTRAVENOUS at 09:25:00

## 2024-10-08 RX ADMIN — PHENYLEPHRINE HCL 100 MCG: 10 MG/ML VIAL (ML) INJECTION at 07:58:00

## 2024-10-08 RX ADMIN — DEXMEDETOMIDINE HYDROCHLORIDE 0.5 MCG/KG/HR: 4 INJECTION, SOLUTION INTRAVENOUS at 07:05:00

## 2024-10-08 RX ADMIN — FAMOTIDINE 20 MG: 10 INJECTION, SOLUTION INTRAVENOUS at 06:46:00

## 2024-10-08 RX ADMIN — MIDAZOLAM HYDROCHLORIDE 2 MG: 1 INJECTION INTRAMUSCULAR; INTRAVENOUS at 09:52:00

## 2024-10-08 RX ADMIN — MIDAZOLAM HYDROCHLORIDE 5 MG: 1 INJECTION INTRAMUSCULAR; INTRAVENOUS at 06:46:00

## 2024-10-08 RX ADMIN — ALBUMIN, HUMAN INJ 5%: 5 SOLUTION INTRAVENOUS at 11:10:00

## 2024-10-08 RX ADMIN — DIPHENHYDRAMINE HYDROCHLORIDE 50 MG: 50 INJECTION INTRAMUSCULAR; INTRAVENOUS at 06:46:00

## 2024-10-08 RX ADMIN — PROTAMINE SULFATE 300 MG: 10 INJECTION, SOLUTION INTRAVENOUS at 10:01:00

## 2024-10-08 RX ADMIN — ROCURONIUM BROMIDE 50 MG: 10 INJECTION, SOLUTION INTRAVENOUS at 08:09:00

## 2024-10-08 RX ADMIN — DOBUTAMINE HYDROCHLORIDE 5 MCG/KG/MIN: 200 INJECTION INTRAVENOUS at 09:33:00

## 2024-10-08 RX ADMIN — SODIUM CHLORIDE: 9 INJECTION, SOLUTION INTRAVENOUS at 06:43:00

## 2024-10-08 RX ADMIN — MIDAZOLAM HYDROCHLORIDE 3 MG: 1 INJECTION INTRAMUSCULAR; INTRAVENOUS at 07:59:00

## 2024-10-08 NOTE — ANESTHESIA PROCEDURE NOTES
Central Line    Date/Time: 10/8/2024 6:50 AM    Performed by: Glen Nye MD  Authorized by: Glen Nye MD    General Information and Staff    Procedure Start:  10/8/2024 6:50 AM  Procedure End:  10/8/2024 7:00 AM  Anesthesiologist:  Glen Nye MD  Performed by:  Anesthesiologist  Patient Location:  OR  Indication: central venous access and CVP monitoring    Site Identification: real time ultrasound guided and image stored and retrievable        Procedure Detail    Patient Position:  Trendelenburg  Laterality:  Right  Site:  Internal jugular  Prep:  Chloraprep  Catheter Size:  9 Fr  Catheter Type:  MAC introducer  Number of Lumens:  Double lumen  Procedure Detail: target vein identified, needle advanced into vein and blood aspirated and guidewire advanced into vein    Seldinger Technique?: Yes    Intravenous Verification: verified by ultrasound and venous blood return    Post Insertion: all ports aspirated, all ports flushed easily, guidewire was removed intact, line was sutured in place and dressing was applied      Assessment    Events: patient tolerated procedure well with no complications      PA Catheter Placement    PA Catheter Placed?: Yes    PA Catheter Type:  Oximetric  PA Catheter Size:  8  Laterality:  Right  Site:  Internal jugular  Placement Confirmation: pressure tracing changes and verified by MICHAEL    PA Catheter Depth (cm):  55  Events: patient tolerated procedure well with no complications      Additional Comments

## 2024-10-08 NOTE — ANESTHESIA PROCEDURE NOTES
Arterial Line    Date/Time: 10/8/2024 6:47 AM    Performed by: Glen Nye MD  Authorized by: Glen Nye MD    General Information and Staff    Procedure Start:  10/8/2024 6:47 AM  Procedure End:  10/8/2024 6:47 AM  Anesthesiologist:  Glen Nye MD  Performed By:  Anesthesiologist  Patient Location:  OR  Indication: continuous blood pressure monitoring and blood sampling needed    Site Identification: real time ultrasound guided and image stored and retrievable    Preanesthetic Checklist: 2 patient identifiers, IV checked, risks and benefits discussed, monitors and equipment checked, pre-op evaluation, timeout performed, anesthesia consent and sterile technique used    Procedure Details    Catheter Size:  20 G  Catheter Length:  1 and 3/4 inch  Catheter Type:  Arrow  Seldinger Technique?: Yes    Laterality:  Left  Site:  Radial artery  Site Prep: chlorhexidine    Line Secured:  Wrist Brace, tape and Tegaderm    Assessment    Events: patient tolerated procedure well with no complications      Medications  10/8/2024 6:47 AM      Additional Comments

## 2024-10-08 NOTE — CONSULTS
[facility]  Consultation    Murtaza Hickey Patient Status:  Inpatient    1950 MRN [id number]   Location [facility] 6NE-A Attending Zurdo Porras MD   Hosp Day # 0 PCP MIL BOSTON MD       Reason for consultation;  MVR     HPI:  This is a 74 year old patient with PMH of CAD s/p PCI, Afib, HTN, DLP, PPM who underwent MV replacement, KE amputation, MAZE. He was seen in the CCU, intubated and sedated.    MDM / Diagnostics reviewed & interpreted:  ECG shows V-paced rhythm    Assessment:    MR s/p MV replacement  CAD s/p PCI  HTN  DLP    Plan:  -Hemodynamics still tenuous, requiring dobutamine, albumin and IVF. Anticipate improvement  -ASA, statin  -routine post op care    Please call with questions. Thank you for your consult.    Level of care: C5      Isabel Gauthier MD  Interventional Cardiology  [facility]    --------------------------------------------------------------------------------------------------------------------------------  ROS 10 systems reviewed, pertinent findings above.    History:  Past Medical History:    Arthritis    Atherosclerosis of coronary artery    Atrial fibrillation (HCC)    Cardiac pacemaker in situ    Congestive heart disease (HCC)    Diverticulosis of sigmoid colon    History of hemorrhoids    PPH: 2002    Hx of colonic polyps    Tubular adenoma, ascending    Hyperlipidemia    Hypertension, essential     Past Surgical History:   Procedure Laterality Date    Cardiac pacemaker placement  2019    Carpal tunnel release Left 2011    Carpal tunnel release Right 2012    Colonoscopy  2006    Tubular adenoma, sigmoid    Colonoscopy  2009    Diverticulosis of sigmoid    Colonoscopy  10/05/2016    Tubular adenoma, ascending; diverticulosis of sigmoid    Colonoscopy  2020    Diverticulosis, sigmoid    Coronary stent placement  2011    Hemorrhoidectomy  2002    PPH: stapled hemorrhoidopexy    Inguinal hernia  repair Left 06/04/2007    Large PerFix plug and patch    Knee replacement surgery Left 2005    Laparoscopic appendectomy      Other  foot     Family History   Problem Relation Age of Onset    Lung Disorder Mother         COPD    Cancer Father         Bone marrow      reports that he has quit smoking. He has never used smokeless tobacco. He reports current alcohol use. He reports that he does not use drugs.    Objective:   Temp: 96.9 °F (36.1 °C)  Pulse: 70  Resp: 21  BP: 111/77  AO: 100/61  FiO2 (%): 60 %    Intake/Output:     Intake/Output Summary (Last 24 hours) at 10/8/2024 1347  Last data filed at 10/8/2024 1300  Gross per 24 hour   Intake 250 ml   Output 770 ml   Net -520 ml       Physical Exam:       General: Intubated.  HEENT: Normocephalic.  Neck: Supple.  Cardiac: Regular. S1, S2 normal. KILLIAN.  Lungs: Mechanical breath sounds.  Extremities: No edema.        Isabel Gauthier MD  10/8/2024  1:47 PM

## 2024-10-08 NOTE — CM/SW NOTE
Patient assessed in [facility]--set up before surgery with [facility]--order and face to face attached in [facility] @ discharge  [phone]

## 2024-10-08 NOTE — ANESTHESIA POSTPROCEDURE EVALUATION
[facility]    Murtaza Hickey Patient Status:  Inpatient   Age/Gender 74 year old male MRN [id number]   Location [facility] 6NE-A Attending Zurdo Porras MD   Hosp Day # 0 PCP MIL BOSTON MD       Anesthesia Post-op Note    MITRAL VALVE REPLACEMENT USING 33MM MAGNA MITRAL EASE; LEFT ATRIAL APPENDAGE AMPUTATION; MAZE OF LEFT ATRIUM; TRANSESOPHAGEAL ECHOCARDIOGRAM    Procedure Summary       Date: 10/08/24 Room / Location:  CVOR 02 / [facility]    Anesthesia Start: 0643 Anesthesia Stop: 1144    Procedure: MITRAL VALVE REPLACEMENT USING 33MM MAGNA MITRAL EASE; LEFT ATRIAL APPENDAGE AMPUTATION; MAZE OF LEFT ATRIUM; TRANSESOPHAGEAL ECHOCARDIOGRAM Diagnosis: (Mitral Regurgitation; Atrial Fibrillation)    Surgeons: Zurdo Porras MD Anesthesiologist: Glen Nye MD    Anesthesia Type: general ASA Status: 3            Anesthesia Type: general    Vitals Value Taken Time   /55 10/08/24 1140   Temp 97 10/08/24 1145   Pulse 70 10/08/24 1144   Resp 11 10/08/24 1144   SpO2 98 % 10/08/24 1144   Vitals shown include unfiled device data.    Patient Location: ICU    Anesthesia Type: general    Airway Patency: intubated    Postop Pain Control: adequate    Mental Status: Other    Nausea/Vomiting: Other    Cardiopulmonary/Hydration status: stable euvolemic    Complications: no apparent anesthesia related complications    Postop vital signs: stable    Dental Exam: Unchanged from Preop    Sign out given to ICU staff.

## 2024-10-08 NOTE — ANESTHESIA PROCEDURE NOTES
Airway  Date/Time: 10/8/2024 6:48 AM  Urgency: elective      General Information and Staff    Patient location during procedure: OR  Anesthesiologist: Glen Nye MD  Performed: anesthesiologist   Performed by: Glen Nye MD  Authorized by: Glen Nye MD      Indications and Patient Condition  Indications for airway management: anesthesia  Sedation level: deep  Preoxygenated: yes  Patient position: sniffing  Mask difficulty assessment: 1 - vent by mask    Final Airway Details  Final airway type: endotracheal airway      Successful airway: ETT  Cuffed: yes   Successful intubation technique: direct laryngoscopy  Endotracheal tube insertion site: oral  Blade: Chris  Blade size: #3  ETT size (mm): 8.0    Cormack-Lehane Classification: grade I - full view of glottis  Placement verified by: capnometry   Cuff volume (mL): 10  Measured from: lips  ETT to lips (cm): 23  Number of attempts at approach: 1    Additional Comments  atraumatic

## 2024-10-08 NOTE — ANESTHESIA PREPROCEDURE EVALUATION
PRE-OP EVALUATION    Patient Name: Murtaza Hickey    Admit Diagnosis: Mitral Regurgitation; Atrial Fibrillation    Pre-op Diagnosis: Mitral Regurgitation; Atrial Fibrillation    MITRAL VALVE REPAIR/REPLACEMENT; LEFT ATRIAL APPENDAGE AMPUTATION; POSSIBLE MAZE OR MODIFIED MAZE OF LEFT ATRIUM; POSSIBLE RIGHT SIDE LESIONS    Anesthesia Procedure: MITRAL VALVE REPAIR/REPLACEMENT; LEFT ATRIAL APPENDAGE AMPUTATION; POSSIBLE MAZE OR MODIFIED MAZE OF LEFT ATRIUM; POSSIBLE RIGHT SIDE LESIONS    Surgeons and Role:     * Zurdo Porras MD - Primary    Pre-op vitals reviewed.        Body mass index is 29.29 kg/m².    Current medications reviewed.  Hospital Medications:   mupirocin (Bactroban) 2% nasal ointment 1 Application  1 Application Nasal Once    [START ON 10/9/2024] ceFAZolin (Ancef) 2g in 10mL IV syringe premix  2 g Intravenous On Call    EPINEPHrine (Adrenalin) 5 mg in sodium chloride 0.9% 250 mL infusion  1-10 mcg/min Intravenous PRN    insulin regular human (Novolin R, Humulin R) 100 Units in sodium chloride 0.9% 100 mL standard infusion (100 mL)  1-40 Units/hr Intravenous PRN       Outpatient Medications:     Medications Prior to Admission   Medication Sig Dispense Refill Last Dose    tamsulosin 0.4 MG Oral Cap Take 1 capsule (0.4 mg total) by mouth at bedtime.   10/7/2024    ezetimibe 10 MG Oral Tab Take 1 tablet (10 mg total) by mouth nightly.   10/7/2024    furosemide (LASIX) 20 MG Oral Tab Take 0.5 tablets (10 mg total) by mouth daily.   10/7/2024    atorvastatin 80 MG Oral Tab Take 1 tablet (80 mg total) by mouth nightly.   10/7/2024    fluticasone propionate (FLONASE ALLERGY RELIEF) 50 MCG/ACT Nasal Suspension 1 spray by Each Nare route daily as needed for Rhinitis.       levothyroxine 100 MCG Oral Tab Take 1 tablet (100 mcg total) by mouth before breakfast.   10/7/2024    metoprolol tartrate 50 MG Oral Tab Take 1 tablet (50 mg total) by mouth 2 (two) times daily.   10/8/2024    rOPINIRole 0.5 MG Oral Tab Take  1 tablet (0.5 mg total) by mouth at bedtime.   10/7/2024    donepezil 10 MG Oral Tab Take 1 tablet (10 mg total) by mouth nightly.   10/7/2024    Multiple Vitamins-Minerals (MULTIVITAMIN ADULT OR) Take 1 tablet by mouth daily.       apixaban (ELIQUIS) 5 MG Oral Tab Take 1 tablet (5 mg total) by mouth 2 (two) times daily.   10/2/2024       Allergies: Rivaroxaban      Anesthesia Evaluation    Patient summary reviewed.    Anesthetic Complications  (-) history of anesthetic complications         GI/Hepatic/Renal    Negative GI/hepatic/renal ROS.                             Cardiovascular                  (+) hypertension   (+) hyperlipidemia  (+) CAD      (+) pacemaker/AICD  (+) valvular problems/murmurs and MR    (+) dysrhythmias and atrial fibrillation  (+) CHF                Endo/Other    Negative endo/other ROS.                              Pulmonary    Negative pulmonary ROS.                       Neuro/Psych    Negative neuro/psych ROS.                                  Past Surgical History:   Procedure Laterality Date    Cardiac pacemaker placement  08/2019    Carpal tunnel release Left 08/2011    Carpal tunnel release Right 02/2012    Colonoscopy  11/08/2006    Tubular adenoma, sigmoid    Colonoscopy  11/18/2009    Diverticulosis of sigmoid    Colonoscopy  10/05/2016    Tubular adenoma, ascending; diverticulosis of sigmoid    Colonoscopy  07/24/2020    Diverticulosis, sigmoid    Coronary stent placement  07/29/2011    Hemorrhoidectomy  08/14/2002    PPH: stapled hemorrhoidopexy    Inguinal hernia repair Left 06/04/2007    Large PerFix plug and patch    Knee replacement surgery Left 2005    Laparoscopic appendectomy      Other  foot     Social History     Socioeconomic History    Marital status:    Tobacco Use    Smoking status: Former    Smokeless tobacco: Never   Vaping Use    Vaping status: Never Used   Substance and Sexual Activity    Alcohol use: Yes     Comment: occasional    Drug use: Never      History   Drug Use Unknown     Available pre-op labs reviewed.  Lab Results   Component Value Date    WBC 6.8 09/30/2024    RBC 5.26 09/30/2024    HGB 16.1 09/30/2024    HCT 46.7 09/30/2024    MCV 88.8 09/30/2024    MCH 30.6 09/30/2024    MCHC 34.5 09/30/2024    RDW 13.6 09/30/2024    .0 09/30/2024     Lab Results   Component Value Date     09/30/2024    K 4.4 09/30/2024     09/30/2024    CO2 26.0 09/30/2024    BUN 16 09/30/2024    CREATSERUM 0.89 09/30/2024    GLU 90 09/30/2024    CA 9.8 09/30/2024     Lab Results   Component Value Date    INR 1.14 09/30/2024         Airway      Mallampati: III  Mouth opening: 3 FB  TM distance: 4 - 6 cm   Cardiovascular             Dental  Comment: No loose teeth per patient               Pulmonary                     Other findings              ASA: 3   Plan: general  NPO status verified and     Post-procedure pain management plan discussed with surgeon and patient.    Comment: Risks and benefits of GA with needed invasive monitoring including art line, cordis, with or without swan adams, MICHAEL. Postop ventilation, allergy, awareness, dental trauma, transfusion, pneumothorax, hemothorax etc. Questions answered. Accept.    Plan/risks discussed with: patient  Use of blood product(s) discussed with: patient    Consented to blood products.          Present on Admission:  **None**

## 2024-10-08 NOTE — ANESTHESIA PROCEDURE NOTES
Procedure Performed: MICHAEL       Start Time:  10/8/2024 7:05 AM       End Time:   10/8/2024 11:00 AM    Preanesthesia Checklist:  Patient identified, IV assessed, risks and benefits discussed, monitors and equipment assessed, procedure being performed at surgeon's request and anesthesia consent obtained.    General Procedure Information  Diagnostic Indications for Echo:  assessment of surgical repair  Physician Requesting Echo: Zurdo Porras MD  Location performed:  OR  Intubated  Bite block placed  Heart visualized  Probe Insertion:  Easy  Probe Type:  Multiplane  Modalities:  Color flow mapping, pulse wave Doppler, continuous wave Doppler and 3D    Echocardiographic and Doppler Measurements    Ventricles    Right Ventricle:  Cavity size normal.  Hypertrophy not present.  Thrombus not present.  Global function normal.    Left Ventricle:  Cavity size normal.  Hypertrophy not present.  Global Function normal.      Ventricular Regional Function:  1- Basal Anteroseptal:  normal  2- Basal Anterior:  normal  3- Basal Anterolateral:  normal  4- Basal Inferolateral:  normal  5- Basal Inferior:  normal  6- Basal Inferoseptal:  normal  7- Mid Anteroseptal:  normal  8- Mid Anterior:  normal  9- Mid Anterolateral:  normal  10- Mid Inferolateral:  normal  11- Mid Inferior:  normal  12- Mid Inferoseptal:  normal  13- Apical Anterior:  normal  14- Apical Lateral:  normal  15- Apical Inferior:  normal  16- Apical Septal:  normal  17- [place]:  normal      Valves    Aortic Valve:  Annulus normal.  Stenosis not present.  Regurgitation +1.  Leaflets normal.  Leaflet motions normal.      Mitral Valve:  Annulus dilated.  Stenosis not present.  Regurgitation +3.  Leaflets myxomatous.  Leaflet motions flail.      Tricuspid Valve:  Annulus normal.  Stenosis not present.  Regurgitation +1.  Leaflets normal.  Leaflet motions normal.        Aorta    Ascending Aorta:  Size normal.  Dissection not present.  Plaque thickness less than 3 mm.  Mobile  plaque not present.    Descending Aorta:  Size normal.  Dissection not present.  Plaque thickness less than 3 mm.  Mobile plaque not present.        Atria    Right Atrium:  Size dilated.  Spontaneous echo contrast not present.  Thrombus not present.  Tumor not present.  Device present.    Left Atrium:  Size dilated.  Spontaneous echo contrast not present.  Thrombus not present.  Tumor not present.  Device not present.        Septa    Atrial Septum:  Intra-atrial septal morphology normal.      Ventricular Septum:  Intra-ventricular septum morphology normal.          Other Findings  Pericardium:  normal  Pleural Effusion:  none  Pulmonary Arteries:  normal  Pulmonary Venous Flow:  normal    Anesthesia Information  Performed Personally  Anesthesiologist:  Glen Nye MD      Post  Post Intervention Follow-up Study:No Change  Ventricular FXN:  Global FXN: Unchanged   Regional FXN: Unchanged    Prosthetic Valve Type:   33 mm Magna Ease Mitral   Normal function?  Yes  Valve Repair:  0 leak       Summary: Moderate to severe central MR, wire seen in descending aorta prior to cannulation, mild central AI, after CPB-3 mmHg gradient, no leak appreciated.  Complications:None

## 2024-10-08 NOTE — CONSULTS
[facility] HOSPITALIST  CONSULT     Murtaza Hickey Patient Status:  Inpatient    1950 MRN [id number]   Location [facility] 6NE-A Attending Zurdo Porras MD   Hosp Day # 0 PCP MIL BOSTON MD     Reason for consult: Medical management    Requested by: Dr. Zurdo Porras    History of Present Illness: Murtaza Hickey is a 74 year old male with PMHx atrial fibrillation on Eliquis s/p PPM, severe mitral regurgitation, CAD s/p LAD stent, hypertension, dyslipidemia, hypothyroidism, BPH, RLS and dementia who presents to the hospital for elective surgery.  Patient went mitral valve replacement, KE amputation and MAZE procedure.  Patient is seen postop.  He is intubated and sedated on Precedex and propofol.  He is also on insulin drip, dobutamine drip and amiodarone drip.    Past Medical History:  Past Medical History:    Arthritis    Atherosclerosis of coronary artery    Atrial fibrillation (HCC)    Cardiac pacemaker in situ    Congestive heart disease (HCC)    Diverticulosis of sigmoid colon    History of hemorrhoids    PPH: 2002    Hx of colonic polyps    Tubular adenoma, ascending    Hyperlipidemia    Hypertension, essential      Past Surgical History:   Past Surgical History:   Procedure Laterality Date    Cardiac pacemaker placement  2019    Carpal tunnel release Left 2011    Carpal tunnel release Right 2012    Colonoscopy  2006    Tubular adenoma, sigmoid    Colonoscopy  2009    Diverticulosis of sigmoid    Colonoscopy  10/05/2016    Tubular adenoma, ascending; diverticulosis of sigmoid    Colonoscopy  2020    Diverticulosis, sigmoid    Coronary stent placement  2011    Hemorrhoidectomy  2002    PPH: stapled hemorrhoidopexy    Inguinal hernia repair Left 2007    Large PerFix plug and patch    Knee replacement surgery Left     Laparoscopic appendectomy      Other  foot     Social History:  reports that he has quit smoking. He has never used smokeless  tobacco. He reports current alcohol use. He reports that he does not use drugs.    Family History:   Family History   Problem Relation Age of Onset    Lung Disorder Mother         COPD    Cancer Father         Bone marrow      Allergies:   Allergies   Allergen Reactions    Rivaroxaban BLEEDING     Medications:    No current facility-administered medications on file prior to encounter.     Current Outpatient Medications on File Prior to Encounter   Medication Sig Dispense Refill    tamsulosin 0.4 MG Oral Cap Take 1 capsule (0.4 mg total) by mouth at bedtime.      ezetimibe 10 MG Oral Tab Take 1 tablet (10 mg total) by mouth nightly.      furosemide (LASIX) 20 MG Oral Tab Take 0.5 tablets (10 mg total) by mouth daily.      atorvastatin 80 MG Oral Tab Take 1 tablet (80 mg total) by mouth nightly.      fluticasone propionate (FLONASE ALLERGY RELIEF) 50 MCG/ACT Nasal Suspension 1 spray by Each Nare route daily as needed for Rhinitis.      levothyroxine 100 MCG Oral Tab Take 1 tablet (100 mcg total) by mouth before breakfast.      metoprolol tartrate 50 MG Oral Tab Take 1 tablet (50 mg total) by mouth 2 (two) times daily.      rOPINIRole 0.5 MG Oral Tab Take 1 tablet (0.5 mg total) by mouth at bedtime.      Multiple Vitamins-Minerals (MULTIVITAMIN ADULT OR) Take 1 tablet by mouth daily.      apixaban (ELIQUIS) 5 MG Oral Tab Take 1 tablet (5 mg total) by mouth 2 (two) times daily.       Review of Systems:   A comprehensive 14 point review of systems was completed.    Pertinent positives and negatives noted in the HPI.    Physical Exam:    /77   Pulse 70   Temp 97.5 °F (36.4 °C) (Temporal)   Resp 15   Ht 5' 7\" (1.702 m)   Wt 182 lb 6.4 oz (82.7 kg)   SpO2 99%   BMI 28.57 kg/m²   General: No acute distress.  Intubated and sedated  HEENT: Normocephalic atraumatic.  Anicteric.  ET tube in place  Respiratory: Clear to auscultation bilaterally anteriorly  Cardiovascular: S1, S2. Regular rate and rhythm.   Chest:  Chest tube site clean, dry and intact.  Sternotomy site and dressing  Abdomen: Soft,, nondistended.  Positive bowel sounds.  Neurologic: Sedated  Extremities: No edema    Diagnostic Data:      Labs:  Recent Labs   Lab 10/08/24  1144   WBC 11.2*   HGB 14.7   MCV 90.5   .0*   INR 1.50*     Recent Labs   Lab 10/08/24  1144   *   BUN 13   CREATSERUM 0.79   CA 7.9*      K 4.4   *   CO2 24.0     Recent Labs   Lab 10/08/24  1144   PTP 18.3*   INR 1.50*     No results for input(s): \"TROP\", \"CK\" in the last 168 hours.    Imaging: Imaging data reviewed in Epic.      ASSESSMENT / PLAN:     #Severe mitral regurgitation  #Atrial fibrillation on Eliquis s/p pacemaker  -S/p mitral valve replacement, KE amputation and MAZE procedure 10/8/2024  -CV surgery primary  -Cardiology also consulted  -Wean inotropes as tolerated  -Empiric amiodarone gtt  -ASA, statin  -Hopefully wean sedation and extubate later tonight or tomorrow and then encourage incentive spirometer  -Eventual PT/OT  -Start metoprolol once hemodynamics improve.  Hold home Eliquis until okay with CV surgery    #Stress hyperglycemia  -A1c is 5.1  -Insulin drip base algorithm.  Can advance algorithm A if needed  -Given A1c, likely will not need frequent Accu-Cheks and subcutaneous insulin once starting diet after extubation     #CAD s/p LAD stent  -ASA, statin    #Hypertension  -Start metoprolol once hemodynamics improve    #Dyslipidemia  -Statin, zetia    #Hypothyroidism  -Levothyroxine    #BPH  -Flomax    #RLS  -Ropinirole    #Dementia  -Donepezil    #Reactive leukocytosis  #Thrombocytopenia, likely consumptive  -Monitor CBC    Quality:  DVT Prophylaxis: SCDs  CODE status: Full  Camacho: Yes    Plan of care discussed with family and RN.    Zane Daigle DO  10/8/2024

## 2024-10-09 ENCOUNTER — APPOINTMENT (OUTPATIENT)
Dept: GENERAL RADIOLOGY | Facility: HOSPITAL | Age: 74
End: 2024-10-09
Attending: THORACIC SURGERY (CARDIOTHORACIC VASCULAR SURGERY)
Payer: MEDICARE

## 2024-10-09 ENCOUNTER — APPOINTMENT (OUTPATIENT)
Dept: GENERAL RADIOLOGY | Facility: HOSPITAL | Age: 74
End: 2024-10-09
Attending: PHYSICIAN ASSISTANT
Payer: MEDICARE

## 2024-10-09 PROBLEM — D72.829 LEUKOCYTOSIS: Status: ACTIVE | Noted: 2024-10-09

## 2024-10-09 PROBLEM — I34.0 MITRAL VALVE INSUFFICIENCY: Status: ACTIVE | Noted: 2024-10-09

## 2024-10-09 PROBLEM — R73.9 HYPERGLYCEMIA: Status: ACTIVE | Noted: 2024-10-09

## 2024-10-09 LAB
ATRIAL RATE: 82 BPM
BASOPHILS # BLD AUTO: 0 X10(3) UL (ref 0–0.2)
BASOPHILS NFR BLD AUTO: 0 %
BUN BLD-MCNC: 11 MG/DL (ref 9–23)
CALCIUM BLD-MCNC: 8.1 MG/DL (ref 8.7–10.4)
CHLORIDE SERPL-SCNC: 112 MMOL/L (ref 98–112)
CO2 SERPL-SCNC: 24 MMOL/L (ref 21–32)
CREAT BLD-MCNC: 0.87 MG/DL
EGFRCR SERPLBLD CKD-EPI 2021: 89 ML/MIN/1.73M2 (ref 60–?)
EOSINOPHIL # BLD AUTO: 0 X10(3) UL (ref 0–0.7)
EOSINOPHIL NFR BLD AUTO: 0 %
ERYTHROCYTE [DISTWIDTH] IN BLOOD BY AUTOMATED COUNT: 13.6 %
GLUCOSE BLD-MCNC: 111 MG/DL (ref 70–99)
GLUCOSE BLD-MCNC: 111 MG/DL (ref 70–99)
GLUCOSE BLD-MCNC: 114 MG/DL (ref 70–99)
GLUCOSE BLD-MCNC: 114 MG/DL (ref 70–99)
GLUCOSE BLD-MCNC: 115 MG/DL (ref 70–99)
GLUCOSE BLD-MCNC: 115 MG/DL (ref 70–99)
GLUCOSE BLD-MCNC: 116 MG/DL (ref 70–99)
GLUCOSE BLD-MCNC: 117 MG/DL (ref 70–99)
GLUCOSE BLD-MCNC: 124 MG/DL (ref 70–99)
GLUCOSE BLD-MCNC: 130 MG/DL (ref 70–99)
GLUCOSE BLD-MCNC: 137 MG/DL (ref 70–99)
GLUCOSE BLD-MCNC: 141 MG/DL (ref 70–99)
HCT VFR BLD AUTO: 35.3 %
HGB BLD-MCNC: 12.4 G/DL
IMM GRANULOCYTES # BLD AUTO: 0.05 X10(3) UL (ref 0–1)
IMM GRANULOCYTES NFR BLD: 0.4 %
LYMPHOCYTES # BLD AUTO: 0.4 X10(3) UL (ref 1–4)
LYMPHOCYTES NFR BLD AUTO: 3.3 %
MAGNESIUM SERPL-MCNC: 1.9 MG/DL (ref 1.6–2.6)
MCH RBC QN AUTO: 31.4 PG (ref 26–34)
MCHC RBC AUTO-ENTMCNC: 35.1 G/DL (ref 31–37)
MCV RBC AUTO: 89.4 FL
MONOCYTES # BLD AUTO: 1.13 X10(3) UL (ref 0.1–1)
MONOCYTES NFR BLD AUTO: 9.4 %
NEUTROPHILS # BLD AUTO: 10.44 X10 (3) UL (ref 1.5–7.7)
NEUTROPHILS # BLD AUTO: 10.44 X10(3) UL (ref 1.5–7.7)
NEUTROPHILS NFR BLD AUTO: 86.9 %
PLATELET # BLD AUTO: 123 10(3)UL (ref 150–450)
POTASSIUM SERPL-SCNC: 3.7 MMOL/L (ref 3.5–5.1)
Q-T INTERVAL: 464 MS
QRS DURATION: 144 MS
QTC CALCULATION (BEZET): 604 MS
R AXIS: -79 DEGREES
RBC # BLD AUTO: 3.95 X10(6)UL
SODIUM SERPL-SCNC: 142 MMOL/L (ref 136–145)
T AXIS: 33 DEGREES
VENTRICULAR RATE: 102 BPM
WBC # BLD AUTO: 12 X10(3) UL (ref 4–11)

## 2024-10-09 PROCEDURE — 71045 X-RAY EXAM CHEST 1 VIEW: CPT | Performed by: PHYSICIAN ASSISTANT

## 2024-10-09 PROCEDURE — 99232 SBSQ HOSP IP/OBS MODERATE 35: CPT | Performed by: INTERNAL MEDICINE

## 2024-10-09 PROCEDURE — 71045 X-RAY EXAM CHEST 1 VIEW: CPT | Performed by: THORACIC SURGERY (CARDIOTHORACIC VASCULAR SURGERY)

## 2024-10-09 RX ORDER — KETOROLAC TROMETHAMINE 15 MG/ML
15 INJECTION, SOLUTION INTRAMUSCULAR; INTRAVENOUS EVERY 6 HOURS PRN
Status: DISCONTINUED | OUTPATIENT
Start: 2024-10-09 | End: 2024-10-09

## 2024-10-09 RX ORDER — AMIODARONE HYDROCHLORIDE 200 MG/1
400 TABLET ORAL DAILY
Status: DISCONTINUED | OUTPATIENT
Start: 2024-10-10 | End: 2024-10-10

## 2024-10-09 RX ORDER — AMIODARONE HYDROCHLORIDE 200 MG/1
400 TABLET ORAL 3 TIMES DAILY
Status: COMPLETED | OUTPATIENT
Start: 2024-10-09 | End: 2024-10-09

## 2024-10-09 RX ORDER — POTASSIUM CHLORIDE 1500 MG/1
40 TABLET, EXTENDED RELEASE ORAL ONCE
Status: COMPLETED | OUTPATIENT
Start: 2024-10-09 | End: 2024-10-09

## 2024-10-09 RX ORDER — ACETAMINOPHEN 10 MG/ML
1000 INJECTION, SOLUTION INTRAVENOUS EVERY 6 HOURS
Status: DISCONTINUED | OUTPATIENT
Start: 2024-10-09 | End: 2024-10-10

## 2024-10-09 RX ORDER — KETOROLAC TROMETHAMINE 15 MG/ML
15 INJECTION, SOLUTION INTRAMUSCULAR; INTRAVENOUS EVERY 6 HOURS
Status: DISPENSED | OUTPATIENT
Start: 2024-10-09 | End: 2024-10-11

## 2024-10-09 NOTE — PROGRESS NOTES
[facility]   part of Located within [facility]     Hospitalist Progress Note     Murtaza Hickey Patient Status:  Inpatient    1950 MRN [id number]   [facility] 6NE-A Attending Zurdo Porras MD   Hosp Day # 1 PCP MIL BOSTON MD     Reason for consult: Medical management     Requested by: Dr. Zurdo Porras    Subjective:     Patient transitioning from bed to chair, no new complaints    Objective:    Review of Systems:   A comprehensive review of systems was completed; pertinent positive and negatives stated in subjective.    Vital signs:  Temp:  [96.7 °F (35.9 °C)-98.5 °F (36.9 °C)] 97.7 °F (36.5 °C)  Pulse:  [70-80] 70  Resp:  [10-30] 18  BP: ()/() 98/72  SpO2:  [79 %-99 %] 94 %  AO: ()/(29-78) 110/61  FiO2 (%):  [40 %-60 %] 40 %    Physical Exam:    General: No acute distress  Respiratory: No wheezes, no rhonchi  Cardiovascular: S1, S2, regular rate and rhythm. Surgical incision with bandage c/d/I, +chest tubes  Abdomen: Soft, Non-tender, non-distended, positive bowel sounds  Neuro: No new focal deficits.   Extremities: No edema    Diagnostic Data:    Labs:  Recent Labs   Lab 10/08/24  1144 10/09/24  0412   WBC 11.2* 12.0*   HGB 14.7 12.4*   MCV 90.5 89.4   .0* 123.0*   INR 1.50*  --        Recent Labs   Lab 10/08/24  1144 10/09/24  0412   * 130*   BUN 13 11   CREATSERUM 0.79 0.87   CA 7.9* 8.1*    142   K 4.4 3.7   * 112   CO2 24.0 24.0       Estimated Creatinine Clearance: 67.5 mL/min (based on SCr of 0.87 mg/dL).    No results for input(s): \"TROP\", \"TROPHS\", \"CK\" in the last 168 hours.    Recent Labs   Lab 10/08/24  1144   PTP 18.3*   INR 1.50*        Microbiology    No results found for this visit on 10/08/24.      Imaging: Reviewed in Epic.    Medications:    amiodarone  400 mg Oral TID    [START ON 10/10/2024] amiodarone  400 mg Oral Daily    insulin aspart  1-10 Units Subcutaneous TID AC and HS    atorvastatin  80 mg Oral Nightly    donepezil  10  mg Oral Nightly    ezetimibe  10 mg Oral Nightly    levothyroxine  100 mcg Oral Before breakfast    rOPINIRole  0.5 mg Oral Nightly    tamsulosin  0.4 mg Oral Nightly    sennosides  8.6 mg Oral BID    docusate sodium  100 mg Oral BID    vancomycin  15 mg/kg Intravenous Q12H    mupirocin  1 Application Nasal BID    aspirin  81 mg Oral Daily    pantoprazole  40 mg Intravenous QAM AC    Or    pantoprazole  40 mg Oral QAM AC    ceFAZolin  2 g Intravenous Q8H       Assessment & Plan:      #Severe mitral regurgitation  #Atrial fibrillation on Eliquis s/p pacemaker  -S/p mitral valve replacement, KE amputation and MAZE procedure 10/8/2024  -CV surgery primary  -Cardiology following  -Wean inotropes as tolerated  -Empiric amiodarone gtt  -ASA, statin  -Hopefully wean sedation and extubate later tonight or tomorrow and then encourage incentive spirometer  -Eventual PT/OT  -Start metoprolol once hemodynamics improve.  Hold home Eliquis until okay with CV surgery     #Stress hyperglycemia  -A1c is 5.1  -blood sugars controlled > can start hyperglycemia protocol if persistently > 180     #CAD s/p LAD stent  -ASA, statin     #Hypertension  -Start metoprolol once hemodynamics improve     #Dyslipidemia  -Statin, zetia     #Hypothyroidism  -Levothyroxine     #BPH  -Flomax     #RLS  -Ropinirole     #Dementia  -Donepezil     #Reactive leukocytosis  #Thrombocytopenia, likely consumptive  -Monitor CBC      Delmis Turpin DO    Supplementary Documentation:     Quality:  DVT Mechanical Prophylaxis: TUCKER hose, SCDs,    DVT Pharmacologic Prophylaxis   Medication   None         DVT Pharmacologic prophylaxis: Aspirin 81 mg      Code Status: Not on file  Camacho: Camacho catheter in place  Camacho Duration (in days): 2    The 21st Century Cures Act makes medical notes like these available to patients in the interest of transparency. Please be advised this is a medical document. Medical documents are intended to carry relevant information,  facts as evident, and the clinical opinion of the practitioner. The medical note is intended as peer to peer communication and may appear blunt or direct. It is written in medical language and may contain abbreviations or verbiage that are unfamiliar.

## 2024-10-09 NOTE — PLAN OF CARE
Assumed at 1930. Vented with no sedation. SBT started. Following command. On tele-V paced. Normotensive. Nitroglycerin gtt weaned off.     Pt extubated at 2015 with anesthesia at bedside.  Pt placed on NC 3L. OG removed as well. Cordis/Art line/Camacho. Hemodynamic monitor with FloTrac. Chest tubes to suction-see flowsheet for output. Ventricular wires in place. R groin dressing-CDI, soft and no hematoma. Pedal pulses palpable. Dilaudid pca for pain management. Dobs/Amio/insulin gtt infusing.     CO 3-5  CI >2  SVV 10-18    Pt up to chair, slightly nauseous-zofran prn given. Dr. Porras updated in the am. Verbal orders received and relayed to day RN. All needs met a this time.

## 2024-10-09 NOTE — PROGRESS NOTES
Progress Note  Murtaza Hickey Patient Status:  Inpatient    1950 MRN [id number]   Location [facility] 6NE-A Attending Zurdo Porras MD   Hosp Day # 1 PCP MIL BOSTON MD     Subjective:  Feels well extubated with plan for chest tube removal today. On Iv amiodarone with plan to adjust to PO loading dose today.  No dyspnea,palpitations, or dizziness/syncope.    Objective:  Physical Exam:   BP 98/72 (BP Location: Right arm)   Pulse 70   Temp 97.7 °F (36.5 °C) (Temporal)   Resp 18   Ht 5' 7\" (1.702 m)   Wt 193 lb 12.6 oz (87.9 kg)   SpO2 94%   BMI 30.35 kg/m²   Temp (24hrs), Av.7 °F (36.5 °C), Min:96.7 °F (35.9 °C), Max:98.5 °F (36.9 °C)       Intake/Output Summary (Last 24 hours) at 10/9/2024 0946  Last data filed at 10/9/2024 0800  Gross per 24 hour   Intake 3637.5 ml   Output 1330 ml   Net 2307.5 ml     Wt Readings from Last 3 Encounters:   10/09/24 193 lb 12.6 oz (87.9 kg)   24 220 lb (99.8 kg)   20 186 lb (84.4 kg)     Telemetry: BiV paced 70 bpm  General: Alert and oriented in no apparent distress lying comfortably in bed on 3L by nasal cannula.  HEENT: No focal deficits. RIJ,  Neck: No JVD, carotids 2+ no bruits.  Cardiac: Regular rate and rhythm, S1, S2 normal, 1/6 systolic murmur L apex, rub or gallop.  Lungs: diminished in the bases with dry crackles otherwise CTA.  Normal excursions and effort.  Abdomen: Soft, non-tender.   Extremities: Without clubbing, cyanosis or edema.  Peripheral pulses are 2+. ART line LUE.  Neurologic: Alert and oriented, normal affect.  Skin: Warm and dry.  Sternotomy incision benign. L sided chest wall pacemaker site benign.      Intake/Output:    Intake/Output Summary (Last 24 hours) at 10/9/2024 0946  Last data filed at 10/9/2024 0800  Gross per 24 hour   Intake 3637.5 ml   Output 1330 ml   Net 2307.5 ml       Last 3 Weights   10/09/24 0500 193 lb 12.6 oz (87.9 kg)   10/08/24 0610 182 lb 6.4 oz (82.7 kg)   24 1102 187 lb (84.8 kg)    06/26/24 1350 220 lb (99.8 kg)   08/06/20 1024 186 lb (84.4 kg)       Labs:  Recent Labs   Lab 10/08/24  1144 10/09/24  0412   * 130*   BUN 13 11   CREATSERUM 0.79 0.87   EGFRCR 92 89   CA 7.9* 8.1*    142   K 4.4 3.7   * 112   CO2 24.0 24.0     Recent Labs   Lab 10/08/24  1144 10/09/24  0412   RBC 4.73 3.95   HGB 14.7 12.4*   HCT 42.8 35.3*   MCV 90.5 89.4   MCH 31.1 31.4   MCHC 34.3 35.1   RDW 13.4 13.6   NEPRELIM  --  10.44*   WBC 11.2* 12.0*   .0* 123.0*         No results for input(s): \"TROP\", \"TROPHS\", \"CK\" in the last 168 hours.    Diagnostics:             Medications:   amiodarone  400 mg Oral TID    [START ON 10/10/2024] amiodarone  400 mg Oral Daily    insulin aspart  1-10 Units Subcutaneous TID AC and HS    atorvastatin  80 mg Oral Nightly    donepezil  10 mg Oral Nightly    ezetimibe  10 mg Oral Nightly    levothyroxine  100 mcg Oral Before breakfast    rOPINIRole  0.5 mg Oral Nightly    tamsulosin  0.4 mg Oral Nightly    sennosides  8.6 mg Oral BID    docusate sodium  100 mg Oral BID    mupirocin  1 Application Nasal BID    aspirin  81 mg Oral Daily    pantoprazole  40 mg Intravenous QAM AC    Or    pantoprazole  40 mg Oral QAM AC    ceFAZolin  2 g Intravenous Q8H      sodium chloride      DOBUTamine Stopped (10/09/24 0645)    nitroGLYCERIN in dextrose 5% Stopped (10/08/24 2110)    norepinephrine      NitroPRUSSide (Nipride) 50 mg in dextrose 5% 250 mL infusion      dextrose 5%-sodium chloride 0.45% 30 mL/hr (10/09/24 0516)    sodium chloride 40 mL/hr at 10/09/24 0400    HYDROmorphone in sodium chloride 0.9%      amiodarone 0.5 mg/min (10/09/24 0741)       Assessment/Plan:    Severe mitral insufficiency s/p Mitral valve replacement with chordal perseveration 33 mm Magna bovine pericardial valve on 10/8/2024  Off pressors  Chest tubes to come out today  Atrial fibrillation hx w/ BiV PPM in place  On IV amiodarone post operatively with plan to transition today per Ct surg  S/p  MAAZE and KE amputation 10/8  Historically on Eliquis for CVA risk reduction outpatient  Currently V-paced 70's  Hx of CAD s/p PCI  No anginal symptoms reported recently  High intensity statin, Zetia, ASA  On BB at home  Leukocytosis  Likely reactive- afebrile- follow  Acute postoperative anemia-expected  Acute mild thrombocytopenia - expected/consumptive  HTN  BP's at goal  HLD  High intensity statin  BPH  On Flomax  Hx of dementia    PLAN  C-tube removal today per CT surg  Po amiodarone per CT surgery  Continue ASA, statin, Zetia  Reintroduce Eliquis 5 mg BID for CVA risk reduction in afib once OK with CT surgical team  Reintroduce BB if no episodes of hypotension off pressors today            Travis Marques PA-C  10/9/2024  9:46 AM     Patient seen and examined. Agree with the findings and plan of care. Eliquis when ok with CV surgery

## 2024-10-09 NOTE — SPIRITUAL CARE NOTE
Spiritual Care Visit Note    Patient Name: Murtaza Hickey Date of Spiritual Care Visit: 10/09/24   : 1950 Primary Dx: <principal problem not specified>       Referred By:      Spiritual Care Taxonomy:    Intended Effects: Build relationship of care and support    Methods: Offer emotional support;Offer spiritual/Confucianism support    Interventions: Acknowledge current situation;Active listening;Prayer for healing;Explain  role    Visit Type/Summary:     - Spiritual Care: Consulted with RN prior to visit. Offered empathic listening and emotional support. Patient and family expressed appreciation for  visit. Provided information regarding how to contact Spiritual Care and left a Spiritual Care information card.  remains available as needed for follow up.    Spiritual Care support can be requested via an Epic consult. For urgent/immediate needs, please contact the On Call  at: Edward: ext [phone]    Angeli Nuñez MA

## 2024-10-09 NOTE — PROGRESS NOTES
[facility]   part of [facility]      [facility] Progress Note    Murtaza Hickey Patient Status:  Inpatient    1950 MRN [id number]   Location [facility] 6NE-A Attending Zurdo Porras MD   Hosp Day # 1 PCP MIL BOSTON MD     Subjective:  Sitting up in chair, Pain is \"there\" and PCA makes him nauseous.     Objective:  BP 98/72 (BP Location: Right arm)   Pulse 70   Temp 97.7 °F (36.5 °C) (Temporal)   Resp 18   Ht 170.2 cm (5' 7\")   Wt 87.9 kg (193 lb 12.6 oz)   SpO2 94%   BMI 30.35 kg/m²   CO:  [3.7 L/min-5.3 L/min] 3.9 L/min  CI:  [1.9 L/min/m2-19 L/min/m2] 1.9 L/min/m2      Intake/Output:    Intake/Output Summary (Last 24 hours) at 10/9/2024 0947  Last data filed at 10/9/2024 0800  Gross per 24 hour   Intake 3637.5 ml   Output 1330 ml   Net 2307.5 ml       Chest Tube Output: 270cc out overnight    Last 3 Weights   10/09/24 0500 87.9 kg (193 lb 12.6 oz)   10/08/24 0610 82.7 kg (182 lb 6.4 oz)   24 1102 84.8 kg (187 lb)   24 1350 99.8 kg (220 lb)   20 1024 84.4 kg (186 lb)     Allergies:  Allergies[1]    Current Facility-Administered Medications   Medication Dose Route Frequency    amiodarone (Pacerone) tab 400 mg  400 mg Oral TID    [START ON 10/10/2024] amiodarone (Pacerone) tab 400 mg  400 mg Oral Daily    ketorolac (Toradol) 15 MG/ML injection 15 mg  15 mg Intravenous Q6H PRN    insulin aspart (NovoLOG) 100 Units/mL FlexPen 1-10 Units  1-10 Units Subcutaneous TID AC and HS    glucose (Dex4) 15 GM/59ML oral liquid 15 g  15 g Oral Q15 Min PRN    Or    glucose (Glutose) 40% oral gel 15 g  15 g Oral Q15 Min PRN    Or    glucose-vitamin C (Dex-4) chewable tab 4 tablet  4 tablet Oral Q15 Min PRN    Or    dextrose 50% injection 50 mL  50 mL Intravenous Q15 Min PRN    Or    glucose (Dex4) 15 GM/59ML oral liquid 30 g  30 g Oral Q15 Min PRN    Or    glucose (Glutose) 40% oral gel 30 g  30 g Oral Q15 Min PRN    Or    glucose-vitamin C (Dex-4) chewable tab 8 tablet  8 tablet Oral  Q15 Min PRN    atorvastatin (Lipitor) tab 80 mg  80 mg Oral Nightly    donepezil (Aricept) tab 10 mg  10 mg Oral Nightly    ezetimibe (Zetia) tab 10 mg  10 mg Oral Nightly    levothyroxine (Synthroid) tab 100 mcg  100 mcg Oral Before breakfast    rOPINIRole (Requip) tab 0.5 mg  0.5 mg Oral Nightly    tamsulosin (Flomax) cap 0.4 mg  0.4 mg Oral Nightly    sodium chloride 0.9% infusion   Intravenous Continuous    acetaminophen (Ofirmev) 10 mg/mL infusion premix 1,000 mg  1,000 mg Intravenous Q6H PRN    melatonin tab 3 mg  3 mg Oral Nightly PRN    sennosides (Senokot) tab 8.6 mg  8.6 mg Oral BID    docusate sodium (Colace) cap 100 mg  100 mg Oral BID    polyethylene glycol (PEG 3350) (Miralax) 17 g oral packet 17 g  17 g Oral Daily PRN    bisacodyl (Dulcolax) 10 MG rectal suppository 10 mg  10 mg Rectal Daily PRN    ondansetron (Zofran) 4 MG/2ML injection 4 mg  4 mg Intravenous Q6H PRN    albumin human (Albumin) 5% injection 12.5 g  12.5 g Intravenous Once PRN    DOBUTamine in dextrose 5% (Dobutrex) 500 mg/250mL infusion premix  2.5-20 mcg/kg/min (Dosing Weight) Intravenous Continuous PRN    nitroGLYCERIN in dextrose 5% 50 mg/250mL infusion premix  5-300 mcg/min Intravenous Continuous PRN    norepinephrine (Levophed) 4 mg/250mL infusion premix  0.5-30 mcg/min Intravenous Continuous PRN    NitroPRUSSide (Nipride) 50 mg in dextrose 5% 250 mL infusion  0.1-4 mcg/kg/min (Dosing Weight) Intravenous Continuous PRN    potassium chloride 20 mEq/100mL IVPB premix 20 mEq  20 mEq Intravenous PRN    Or    potassium chloride 40 mEq/100mL IVPB premix (central line) 40 mEq  40 mEq Intravenous PRN    calcium gluconate 3 g in sodium chloride 0.9% 100 mL IVPB  3 g Intravenous PRN    magnesium sulfate in dextrose 5% 1 g/100mL infusion premix 1 g  1 g Intravenous PRN    magnesium sulfate in sterile water for injection 2 g/50mL IVPB premix 2 g  2 g Intravenous PRN    mupirocin (Bactroban) 2% nasal ointment 1 Application  1 Application  Nasal BID    dextrose 5%-sodium chloride 0.45% infusion   mL/hr Intravenous Continuous    morphINE PF 2 MG/ML injection 2 mg  2 mg Intravenous Q2H PRN    Or    morphINE PF 4 MG/ML injection 4 mg  4 mg Intravenous Q2H PRN    aspirin DR tab 81 mg  81 mg Oral Daily    pantoprazole (Protonix) 40 mg in sodium chloride 0.9% PF 10 mL IV push  40 mg Intravenous QAM AC    Or    pantoprazole (Protonix) DR tab 40 mg  40 mg Oral QAM AC    ceFAZolin (Ancef) 2g in 10mL IV syringe premix  2 g Intravenous Q8H    sodium chloride 0.9% infusion   Intravenous Continuous    HYDROmorphone in sodium chloride 0.9% (Dilaudid) 20 mg/100mL PCA premix   Intravenous Continuous    naloxone (Narcan) 0.4 MG/ML injection 0.08 mg  0.08 mg Intravenous Q5 Min PRN    diphenhydrAMINE (Benadryl) 50 mg/mL  injection 12.5 mg  12.5 mg Intravenous Q4H PRN    amiodarone (Cordarone) 450 mg in dextrose 5% 250 mL infusion  0.5 mg/min Intravenous Continuous       Labs:  Lab Results   Component Value Date    WBC 12.0 10/09/2024    HGB 12.4 10/09/2024    HCT 35.3 10/09/2024    .0 10/09/2024    CREATSERUM 0.87 10/09/2024    BUN 11 10/09/2024     10/09/2024    K 3.7 10/09/2024     10/09/2024    CO2 24.0 10/09/2024     10/09/2024    CA 8.1 10/09/2024       Chest Xray:   CONCLUSION:    Endotracheal and nasogastric tubes removed, support lines and catheters otherwise stable. No developing pneumothorax.  Mild stable atelectasis lung base.  Stable heart size.  Stable pacemaker. Continued clinical and x-ray follow-up   advised.     Physical Exam:    Neuro: alert and oriented. Intact. NAD   Lungs: CTA. Diminished.   Heart: s1s2 RRR paced on monitor, soft rub. Sternum stable   Abdomen: soft, nontender hypoactive bowel sounds  Extremities:+pulses. Trace edema       Assessment/Plan:  Patient Active Problem List   Diagnosis    Hx of colonic polyps    Diverticulosis of sigmoid colon    Hyperlipidemia    Hypertension, essential    Congestive heart  disease (HCC)    Atherosclerosis of coronary artery    Atrial fibrillation (HCC)    Arthritis    History of hemorrhoids    Cardiac pacemaker in situ       POD# 1 Mitral valve replacement, modified maze and amputation of left atrial appendage   - HD stable off gtts. Ok to deline, including pacer wires. Pt has PPM   - ok to remove chest tubes  - Transition amio gtt to oral today  - neuro intact  - post op anemia/ TCP, excepted.  Monitor  - leukocytosis, likely reactive. Monitor  - renal function intact, adequate urine output   - fluid overload  - pain management, transition off PCA. Toradol/ Ofirmev   - PPX: TEDs/ SCDs/ Protonix   - Encourage deep breathing, coughing, I.S.   - Increase activity. As able today. PT/OT/ cardiac rehab   - ccu care    D/W Dr.Foy Monica VILLALOBOS RN  10/9/2024  9:47 AM          [1]   Allergies  Allergen Reactions    Rivaroxaban BLEEDING

## 2024-10-09 NOTE — H&P
Mr. Hickey is a super nice 73-year-old gentleman who appears younger than his stated age.  He has had a known significant mitral valve insufficiency for a number of years.  Several months ago he underwent updated transthoracic echo which seemed to indicate the insufficiency was getting worse.  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  One can see at least two, if not three, separate jets which fully occupy the left atrium which itself is enlarged.  Left ventricular function is preserved.  His rhythm is atrial fibrillation and has been almost continually atrial fibrillation for 14 years.  He has trivial tricuspid valve insufficiency.  The patient has also had prior stenting of the LAD but repeat angiographically reveal nonobstructed coronaries.     Past medical history, though, is otherwise pretty unremarkable.  He is treated for hypertension and dyslipidemia.  Other than that, there are no significant issues.  He is not diabetic.  He has not had stroke.  There is no renal or liver disease.  He quit smoking.  I would note, however, he has had bilateral injections for vein disease in the legs.  He has also had bilateral knee replacements.  He had a permanent pacemaker placed in 2016 which has significantly helped his episodes of congestive heart failure; in fact, he's basically had none since that time.  Family history does not contribute.    On review of systems, the patient has no complaints of blurred vision or hearing problems.  The patient denies palpitations, wheezing, syncope, or dizziness.  There are no symptoms of GI reflux, urgency or frequency of urination, or rashes.  The patient has normal mobility.  The patient denies any history of bleeding disorders.     Physical examination shows a gentleman who has arrived with his wife for interview.  She and I know each  other from back in the day at [facility] in [place].  Pupils are equal and round.  Carotid pulses are equal bilaterally.  The rhythm is paced.  The lungs are clear without rales, rhonchi, or wheezing.  The spine is straight.  Cardiac examination shows a quiet precordium.  S1 and S2 are normal.  There is a fairly unimpressive apical systolic murmur which I would say is grade 2.  The abdomen is benign.  Examination of the extremities shows bilateral knee replacement scars; there is no edema.       We have had a lengthy discussion regarding the nature of the patient's cardiovascular condition that being severe mitral valve insufficiency related primarily to posterior leaflet prolapse.  There may be a slight area of flail.  There are multiple jets.  We have discussed options for treatment including continued medical therapy, transcatheter treatment via MitraClip, or surgical correction.  He meets indication for surgical correction based on the severity of the MR, left atrial enlargement, and atrial fibrillation.      Were surgery to occur, we would explore the mitral valve and either repair it or replace it.  In his age group the long term results are virtually identical for repair versus replacement.  That said, if it appears repairable, we will do so.  Otherwise, it would be replaced with chordal preservation utilizing a tissue valve.  Additionally, we would amputate the left atrial appendage to reduce future stroke risk.  And if time allowed we would ablate via Maze or modified Maze at least of the left atrium if not right-sided lesions as well depending upon time on pump.  Operative risk is probably on the order of 1-2% and I have discussed the STS risk factors and score with the patient.       The patient and his wife were left to think this all over and have elected for surgery which we have scheduled for him.      Thank you for allowing us to see this patient in consultation.        Sincerely,    Zurdo Porras M.D.

## 2024-10-09 NOTE — PHYSICAL THERAPY NOTE
PHYSICAL THERAPY EVALUATION - INPATIENT     Room Number: 6602/6602-A  Evaluation Date: 10/9/2024  Type of Evaluation: Initial  Physician Order: PT Eval and Treat    Presenting Problem: s/p  mitral valve replacement, KE amputation and MAZE  10/8  Co-Morbidities : atrial fibrillation on Eliquis s/p PPM, severe mitral regurgitation, CAD s/p LAD stent, hypertension, dyslipidemia, hypothyroidism, BPH, RLS and dementia  Reason for Therapy: Mobility Dysfunction and Discharge Planning    PHYSICAL THERAPY ASSESSMENT   Patient is a 74 year old male admitted 10/8/2024 for planned procedure s/p MVR 10/8.  Prior to admission, patient's baseline is indep.  Patient is currently functioning below baseline with bed mobility, transfers, gait, stair negotiation, and standing prolonged periods.  Patient is requiring contact guard assist as a result of the following impairments: decreased functional strength, decreased endurance/aerobic capacity, decreased muscular endurance, medical status, and decreased compliance/participation.  Physical Therapy will continue to follow for duration of hospitalization.    Patient will benefit from continued skilled PT Services at discharge to promote prior level of function.  Anticipate patient will return home with home health PT.    PLAN DURING HOSPITALIZATION  Nursing Mobility Recommendation : 1 Assist  PT Device Recommendation: None  PT Treatment Plan: Bed mobility;Body mechanics;Coordination;Endurance;Energy conservation;Patient education;Family education;Gait training;Neuromuscular re-educate;Range of motion;Strengthening;Stoop training;Stair training;Transfer training;Balance training  Rehab Potential : Good  Frequency (Obs): 3-5x/week     CURRENT GOALS    Goal #1 Patient is able to demonstrate supine - sit EOB @ level: supervision     Goal #2 Patient is able to demonstrate transfers EOB to/from Chair/Wheelchair at assistance level: supervision     Goal #3 Patient is able to ambulate 150 feet  with assist device: walker - rolling at assistance level: supervision     Goal #4 Patient will navigate 3 stairs with rail and SBA   Goal #5    Goal #6    Goal Comments: Goals established on 10/9/2024      PHYSICAL THERAPY MEDICAL/SOCIAL HISTORY  History related to current admission: Patient is a 74 year old male admitted on 10/8/2024 from home for planned procedure s/p MVR 10/8.    HOME SITUATION  Type of Home: House ([facility])  Home Layout: One level  Stairs to Enter : 3   Railing: Yes              Lives With: Spouse    Drives: Yes          Prior Level of Ava: indep, denies hx of falls in the past 6 months     SUBJECTIVE  \" I want to sleep\"     OBJECTIVE  Precautions: Cardiac;Sternal  Fall Risk: Standard fall risk    WEIGHT BEARING RESTRICTION     PAIN ASSESSMENT  Ratin  Location: sternal  Management Techniques: Activity promotion;Body mechanics;Repositioning    COGNITION  Overall Cognitive Status:  WFL - within functional limits    RANGE OF MOTION AND STRENGTH ASSESSMENT  Upper extremity ROM and strength are within functional limits     Lower extremity ROM is within functional limits     Lower extremity strength is within functional limits     BALANCE  Static Sitting: Good  Dynamic Sitting: Good  Static Standing: Fair -  Dynamic Standing: Fair -    ADDITIONAL TESTS                                    ACTIVITY TOLERANCE  Pulse:  (HR 70s with activity)  Heart Rate Source: Monitor                   O2 WALK       NEUROLOGICAL FINDINGS                        AM-PAC '6-Clicks' INPATIENT SHORT FORM - BASIC MOBILITY  How much difficulty does the patient currently have...  Patient Difficulty: Turning over in bed (including adjusting bedclothes, sheets and blankets)?: None   Patient Difficulty: Sitting down on and standing up from a chair with arms (e.g., wheelchair, bedside commode, etc.): A Little   Patient Difficulty: Moving from lying on back to sitting on the side of the bed?: None   How much help from  another person does the patient currently need...   Help from Another: Moving to and from a bed to a chair (including a wheelchair)?: A Little   Help from Another: Need to walk in hospital room?: A Little   Help from Another: Climbing 3-5 steps with a railing?: A Lot     AM-PAC Score:  Raw Score: 19   Approx Degree of Impairment: 41.77%   Standardized Score (AM-PAC Scale): 45.44   CMS Modifier (G-Code): CK    FUNCTIONAL ABILITY STATUS  Gait Assessment   Functional Mobility/Gait Assessment  Gait Assistance: Contact guard assist  Distance (ft): 25  Assistive Device: Rolling walker  Pattern: Shuffle    Skilled Therapy Provided     Bed Mobility:  Rolling: mod I   Supine to sit: NT   Sit to supine: min A *assist with BLE    Transfer Mobility:  Sit to stand: CGA   Stand to sit: CGA  Gait = CGA    Therapist's Comments: Discussed case with RN prior to session initiation. Pt agreeable to participation in therapy. Pt educated on role of therapy throughout stay, post op precautions with cuing for appropriate body mechanics during bed mobility (log roll technique) and UE placement during STS transfer to RW. Pt participated in gait training with RW - cuing provided for placement of RW to maintain BLE between handles. Educated on EC and pacing techniques and importance of continued ambulation for recovery per post op protocol. Pt tolerated session well HR 70s with activity. /70 sitting EOB. Pt on 1.5-2L throughout session initially reading 91% at rest, unable to obtain pleth while mobilizing - RN aware.         Exercise/Education Provided:  Bed mobility  Body mechanics  Energy conservation  Functional activity tolerated  Gait training  Transfer training    Patient End of Session: In bed;Needs met;Call light within reach;RN aware of session/findings;All patient questions and concerns addressed;Hospital anti-slip socks;SCDs in place;Alarm set;Family present      Patient Evaluation Complexity Level:  History Low - no personal  factors and/or co-morbidities   Examination of body systems Low -  addressing 1-2 elements   Clinical Presentation Low- Stable   Clinical Decision Making Low Complexity       PT Session Time: 20 minutes  Gait Training: 10 minutes  Therapeutic Activity: 5 minutes  Neuromuscular Re-education:  minutes  Therapeutic Exercise:  minutes

## 2024-10-09 NOTE — OCCUPATIONAL THERAPY NOTE
OCCUPATIONAL THERAPY EVALUATION - INPATIENT     Room Number: 6602/6602-A  Evaluation Date: 10/9/2024  Type of Evaluation: Initial  Presenting Problem: MVR 10/8    Physician Order: IP Consult to Occupational Therapy  Reason for Therapy: ADL/IADL Dysfunction and Discharge Planning    OCCUPATIONAL THERAPY ASSESSMENT   Patient is currently functioning near baseline with  all ADL . Prior to admission, patient's baseline is independent.  Patient is requiring minimum assistance , CGA as a result of the following impairments: decreased endurance. Occupational Therapy will continue to follow for duration of hospitalization.    Patient will benefit from continued skilled OT Services for duration of hospitalization, however, given the patient is functioning near baseline level do not anticipate skilled therapy needs at discharge     History Related to Current Admission: Patient is a 74 year old male admitted on 10/8/2024 with Presenting Problem: MVR 10/8. Co-Morbidities : atrial fibrillation on Eliquis s/p PPM, severe mitral regurgitation, CAD s/p LAD stent, hypertension, dyslipidemia, hypothyroidism, BPH, RLS and dementia       EVALUATION SESSION:  Patient Start of Session: seated    FUNCTIONAL TRANSFER ASSESSMENT  Sit to Stand: Chair  Chair: Contact Guard Assist    BED MOBILITY  Supine to Sit : Not tested  Sit to Supine (OT): Contact Guard Assist    O2 SATURATIONS  Oxygen Therapy  SPO2% on Oxygen at Rest: 95  At rest oxygen flow (liters per minute): 2  SPO2% Ambulation on Oxygen:  (pulse oximeter not reading well, rn aware)  Ambulation oxygen flow (liters per minute): 2    COGNITION  Overall Cognitive Status:  WFL - within functional limits    Upper Extremity   ROM: within functional limits   Strength: within functional limits     EDUCATION PROVIDED  Patient Education : Role of Occupational Therapy; Plan of Care; Functional Transfer Techniques; Surgical Precautions; Posture/Positioning  Patient's Response to Education:  Verbalized Understanding    Equipment used: rw     Therapist comments: seated in the chair, family present.   Educated the pt about sternal precautions, role of OT, and plan of care. 95% using 2 liters. Seated SBP in 110s. No dizziness. CGA to stand and to ambulate to just outside of the room. CGA log rolling sit to supine. Pt used the red pillow for support during ambulation and bed mobility.     Patient End of Session: In bed;Needs met;Call light within reach;RN aware of session/findings;All patient questions and concerns addressed;Hospital anti-slip socks;SCDs in place;Family present    OCCUPATIONAL PROFILE    HOME SITUATION  Type of Home: House  Home Layout: One level  Lives With: Spouse       Shower/Tub and Equipment: Walk-in shower       Occupation/Status: retired      Drives: Yes       Prior Level of Function: independent with ADL and IADL. Retired . Enjoys going fishing and playing [employer] balls.  L[facility] in [facility].     PAIN ASSESSMENT  Ratin  Location: surgical  Management Techniques: Body mechanics    OBJECTIVE  Precautions: Cardiac;Sternal  Fall Risk: Standard fall risk    ASSESSMENTS    AM-PAC ‘6-Clicks’ Inpatient Daily Activity Short Form  -   Putting on and taking off regular lower body clothing?: A Little  -   Bathing (including washing, rinsing, drying)?: A Little  -   Toileting, which includes using toilet, bedpan or urinal? : A Little  -   Putting on and taking off regular upper body clothing?: A Little  -   Taking care of personal grooming such as brushing teeth?: None  -   Eating meals?: None    AM-PAC Score:  Score: 20  Approx Degree of Impairment: 38.32%  Standardized Score (AM-PAC Scale): 42.03    ADDITIONAL TESTS     NEUROLOGICAL FINDINGS      COGNITION ASSESSMENTS     PLAN  OT Device Recommendations: None  OT Treatment Plan: Energy conservation/work simplification techniques;ADL training;Balance activities;Functional transfer training;UE strengthening/ROM;Patient/Family  education;Compensatory technique education  Rehab Potential : Good  Frequency: 3x/week  Number of Visits to Meet Established Goals: 2    ADL Goals   Patient will perform upper body dressing:  with modified independent  Patient will perform lower body dressing:  with modified independent  Patient will perform toileting: with modified independent    Functional Transfer Goals  Patient will transfer to toilet:  with modified independent    UE Exercise Program Goal  Patient will be independent with bilateral AROM HEP (home exercise program). Post CV surgery HEP.    Patient Evaluation Complexity Level:   Occupational Profile/Medical History LOW - Brief history including review of medical or therapy records    Specific performance deficits impacting engagement in ADL/IADL LOW  1 - 3 performance deficits    Client Assessment/Performance Deficits MODERATE - Comorbidities and min to mod modifications of tasks    Clinical Decision Making LOW - Analysis of occupational profile, problem-focused assessments, limited treatment options    Overall Complexity LOW     OT Session Time: 18 minutes  Self-Care Home Management:  minutes  Therapeutic Activity: 8 minutes

## 2024-10-09 NOTE — OPERATIVE REPORT
[facility]    PATIENT'S NAME: DANIA VELÁZQUEZ   ATTENDING PHYSICIAN: Zurdo Porras MD   OPERATING PHYSICIAN: Zurdo Porras MD   PATIENT ACCOUNT#:   [id number]    LOCATION:  [address] [place] [address]  MEDICAL RECORD #:   [id number]       YOB: 1950  ADMISSION DATE:       10/08/2024      OPERATION DATE:  10/08/2024    OPERATIVE REPORT    PREOPERATIVE DIAGNOSIS:  Mitral insufficiency, atrial fibrillation.  POSTOPERATIVE DIAGNOSIS:  Mitral insufficiency, atrial fibrillation.  PROCEDURE:    1.   Mitral valve replacement with chordal preservation using 33 mm Magna bovine pericardial valve.  2.   Modified maze with bipolar radiofrequency including amputation of the left atrial appendage.    ASSISTANT:  Stephen Damico PA-C.    ANESTHESIA:  General endotracheal.    BLOOD LOSS:  600 mL.    COMPLICATIONS:  None.     TRANSFUSION:  None     INDICATIONS:  The patient is a 74-year-old gentleman with diagnosis of severe mitral valve insufficiency.  The patient has multiple jets across the line of coaptation with general billowing of the valve.  It appears that the primary lesion is that of posterior prolapse, though the entire valve is quite myxomatous.  The left atrium is enlarged.  The patient has been in atrial fibrillation for 14 years.  He has had a prior LAD stent.  Coronaries presently are nonobstructed.  Risks, benefits, and options of repair versus replacement were discussed.    OPERATIVE TECHNIQUE:  Appropriate lines and catheters were placed.  General anesthesia was induced.  Tyler-Nithya was placed.  Transesophageal echo was placed.  Transcranial oximetry was placed.  Common femoral arterial line was placed.  Patient prepped and draped.  Sternotomy was performed.  Pericardium was opened.  Right atrium, right ventricle, and pulmonary artery were all enlarged.  The patient was heparinized.  The aorta was cannulated in a proximal transverse arch.  SVC was cannulated.  Partial bypass was established at 2.5 L flow to  decompress the heart, at which point I was able to place an IVC pursestring and cannula, 32-[place].  With full flow established, the patient was cooled to 26 degrees centigrade.  The aorta was crossclamped.  Blood cardioplegia was infused to achieve electromechanical arrest of the heart.  The right-sided pulmonary veins were then ablated x2 with AtriCure.  The left-sided pulmonary veins were then ablated x2 with AtriCure.  The left atrial appendage, which was considerably enlarged, was amputated.  There were a few tiny clots present.  The atrial appendage was oversewn with 2 layers of 3-0 Prolene suture after placing a radiofrequency connecting lesion from the atrial appendage to the left superior pulmonary vein.  Cardioplegia was given.  Retrograde coronary cannula was carefully placed.  The patient has a lead in the coronary sinus, as well as transvenous pacemaker.  The left atrium was opened through a left lateral atriotomy.  Exposure of the valve was excellent.  One could see there were multiple places of leakage of the valve all along the line of coaptation, and in fact, the major lesion was actually flail of the A2 segment anteriorly.  Given the general myxomatous nature of the valve and the multiple areas of regurgitation, I felt more confident with replacement using chordal preservation.  Portions of the anterior and posterior leaflet were resected with significant preservation of chordal structures both anteriorly and posteriorly.  The area was sized and found to accommodate a 33 mm prosthesis.  Valve sutures were placed circumferentially around the annulus with pledgets on the ventricular side.  These were subsequently passed through the sewing ring of the valve while giving continuous retrograde cardioplegia.  The valve was lowered in place and tied using a combination of manual knots and Cor-Knots.  It seated perfectly.  Rewarming was begun while the atriotomy was closed.  Prior to complete closure, the  heart was manually de-aired.  With the patient in Trendelenburg position, warm retrograde, then warm antegrade cardioplegia was given.  The aorta was unclamped.  The heart was defibrillated several times, ultimately to a paced rhythm from his intrinsic pacemaker.  Following complete and thorough rewarming and de-airing, the patient was weaned from bypass without difficulty.  Pump time 109 minutes.  Crossclamp 77 minutes.  Cardioplegia 2100 mL.  Patient was decannulated.  Protamine was administered.  Pacing leads were applied to the ventricle.  Chest was closed with double-stranded wire, supplemented with two 8-hole titanium plates as the bone seemed a little soft.  Subcutaneous tissue and skin were closed.  Patient was taken to the ICU in stable condition.  The patient's wife, daughter, and son were updated by me in person regarding the patient's condition and the conduct of the operation.    Dictated By Zurdo Porras MD  d: 10/08/2024 11:41:17  t: 10/08/2024 20:10:13  [facility] [id number]  /

## 2024-10-09 NOTE — PLAN OF CARE
Assumed pt care this am around 0730. Received pt with usual lines, amio gtt and insulin gtt. Orders to discontinue lines. Chest tubes, busby, art line, and PW removed without complications. Transitioned insulin to sliding scale. Amio PO began, amio gtt stopped and cordis discontinued this afternoon without complications. Pt appears V paced on telemetry. Requiring 1L NC. Up in chair x3, ambulated hallways x2, incentive spirometer encouraged. Poor appetite.     Wife at bedside with patient today, questions answered.

## 2024-10-10 LAB
ANION GAP SERPL CALC-SCNC: 4 MMOL/L (ref 0–18)
BUN BLD-MCNC: 16 MG/DL (ref 9–23)
CALCIUM BLD-MCNC: 8.8 MG/DL (ref 8.7–10.4)
CHLORIDE SERPL-SCNC: 109 MMOL/L (ref 98–112)
CO2 SERPL-SCNC: 23 MMOL/L (ref 21–32)
CREAT BLD-MCNC: 1 MG/DL
EGFRCR SERPLBLD CKD-EPI 2021: 78 ML/MIN/1.73M2 (ref 60–?)
ERYTHROCYTE [DISTWIDTH] IN BLOOD BY AUTOMATED COUNT: 13.7 %
GLUCOSE BLD-MCNC: 109 MG/DL (ref 70–99)
GLUCOSE BLD-MCNC: 112 MG/DL (ref 70–99)
GLUCOSE BLD-MCNC: 124 MG/DL (ref 70–99)
GLUCOSE BLD-MCNC: 132 MG/DL (ref 70–99)
GLUCOSE BLD-MCNC: 136 MG/DL (ref 70–99)
HCT VFR BLD AUTO: 36.3 %
HGB BLD-MCNC: 12.2 G/DL
MCH RBC QN AUTO: 31.1 PG (ref 26–34)
MCHC RBC AUTO-ENTMCNC: 33.6 G/DL (ref 31–37)
MCV RBC AUTO: 92.6 FL
OSMOLALITY SERPL CALC.SUM OF ELEC: 285 MOSM/KG (ref 275–295)
PLATELET # BLD AUTO: 101 10(3)UL (ref 150–450)
POTASSIUM SERPL-SCNC: 4.4 MMOL/L (ref 3.5–5.1)
POTASSIUM SERPL-SCNC: 4.4 MMOL/L (ref 3.5–5.1)
RBC # BLD AUTO: 3.92 X10(6)UL
SODIUM SERPL-SCNC: 136 MMOL/L (ref 136–145)
WBC # BLD AUTO: 13 X10(3) UL (ref 4–11)

## 2024-10-10 PROCEDURE — 99232 SBSQ HOSP IP/OBS MODERATE 35: CPT | Performed by: INTERNAL MEDICINE

## 2024-10-10 RX ORDER — FUROSEMIDE 10 MG/ML
20 INJECTION INTRAMUSCULAR; INTRAVENOUS ONCE
Status: DISCONTINUED | OUTPATIENT
Start: 2024-10-10 | End: 2024-10-10

## 2024-10-10 RX ORDER — FUROSEMIDE 10 MG/ML
40 INJECTION INTRAMUSCULAR; INTRAVENOUS
Status: DISCONTINUED | OUTPATIENT
Start: 2024-10-10 | End: 2024-10-12

## 2024-10-10 RX ORDER — PROCHLORPERAZINE MALEATE 5 MG/1
5 TABLET ORAL ONCE
Status: COMPLETED | OUTPATIENT
Start: 2024-10-10 | End: 2024-10-10

## 2024-10-10 RX ORDER — HYDROCODONE BITARTRATE AND ACETAMINOPHEN 5; 325 MG/1; MG/1
1 TABLET ORAL EVERY 4 HOURS PRN
Status: DISCONTINUED | OUTPATIENT
Start: 2024-10-10 | End: 2024-10-12

## 2024-10-10 RX ORDER — HYDROCODONE BITARTRATE AND ACETAMINOPHEN 5; 325 MG/1; MG/1
2 TABLET ORAL EVERY 4 HOURS PRN
Status: DISCONTINUED | OUTPATIENT
Start: 2024-10-10 | End: 2024-10-12

## 2024-10-10 RX ORDER — PROCHLORPERAZINE MALEATE 5 MG/1
5 TABLET ORAL EVERY 6 HOURS PRN
Status: DISCONTINUED | OUTPATIENT
Start: 2024-10-10 | End: 2024-10-10

## 2024-10-10 NOTE — PLAN OF CARE
Assumed patient care at approximately 1100. Patient A&Ox4. Room air, pt denies any pain or shortness of breath. Vital signs stable, NSR on telemetry. Cont B/B. Up with walker. (Intense wounds and any wound care, patient preferences ie bed alarm off)    Plan of care: Monitor pain     Ambulate tid     Clean incision daily       Plan of care updated with patient and family. All questions answered, patient verbalized understanding of plan. Call light within reach and all needs met at this time.     Problem: Patient/Family Goals  Goal: Patient/Family Long Term Goal  Description: Patient's Long Term Goal: No more hospitalizations after discharge.    Interventions:  - Take medications as prescribed  - Healthy diet   - See additional Care Plan goals for specific interventions  Outcome: Progressing  Goal: Patient/Family Short Term Goal  Description: Patient's Short Term Goal: Going home    Interventions:   - Take medications as prescribed  - Walk x4/day   - Wear SCD as prescribed   - See additional Care Plan goals for specific interventions  Outcome: Progressing     Problem: CARDIOVASCULAR - ADULT  Goal: Maintains optimal cardiac output and hemodynamic stability  Description: INTERVENTIONS:  - Monitor vital signs, rhythm, and trends  - Monitor for bleeding, hypotension and signs of decreased cardiac output  - Evaluate effectiveness of vasoactive medications to optimize hemodynamic stability  - Monitor arterial and/or venous puncture sites for bleeding and/or hematoma  - Assess quality of pulses, skin color and temperature  - Assess for signs of decreased coronary artery perfusion - ex. Angina  - Evaluate fluid balance, assess for edema, trend weights  Outcome: Progressing  Goal: Absence of cardiac arrhythmias or at baseline  Description: INTERVENTIONS:  - Continuous cardiac monitoring, monitor vital signs, obtain 12 lead EKG if indicated  - Evaluate effectiveness of antiarrhythmic and heart rate control medications as  ordered  - Initiate emergency measures for life threatening arrhythmias  - Monitor electrolytes and administer replacement therapy as ordered  Outcome: Progressing     Problem: PAIN - ADULT  Goal: Verbalizes/displays adequate comfort level or patient's stated pain goal  Description: INTERVENTIONS:  - Encourage pt to monitor pain and request assistance  - Assess pain using appropriate pain scale  - Administer analgesics based on type and severity of pain and evaluate response  - Implement non-pharmacological measures as appropriate and evaluate response  - Consider cultural and social influences on pain and pain management  - Manage/alleviate anxiety  - Utilize distraction and/or relaxation techniques  - Monitor for opioid side effects  - Notify MD/LIP if interventions unsuccessful or patient reports new pain  - Anticipate increased pain with activity and pre-medicate as appropriate  Outcome: Progressing     Problem: SAFETY ADULT - FALL  Goal: Free from fall injury  Description: INTERVENTIONS:  - Assess pt frequently for physical needs  - Identify cognitive and physical deficits and behaviors that affect risk of falls.  - Mobile fall precautions as indicated by assessment.  - Educate pt/family on patient safety including physical limitations  - Instruct pt to call for assistance with activity based on assessment  - Modify environment to reduce risk of injury  - Provide assistive devices as appropriate  - Consider OT/PT consult to assist with strengthening/mobility  - Encourage toileting schedule  Outcome: Progressing

## 2024-10-10 NOTE — PROGRESS NOTES
Progress Note  Murtaza Hickey Patient Status:  Inpatient    1950 MRN [id number]   [facility] 6NE-A Attending Zurdo Porras MD   Hosp Day # 2 PCP MIL BOSTON MD     Subjective:  Mr. Hickey feels well today. Ambulated for 100ft. No dyspnea, orthopnea, dizziness, or palpitations.  Notes some \"puffiness\" of his hands to the nursing team.    Objective:  Physical Exam:   /74   Pulse 70   Temp 98.5 °F (36.9 °C) (Temporal)   Resp 23   Ht 5' 7\" (1.702 m)   Wt 181 lb 14.4 oz (82.5 kg)   SpO2 97%   BMI 28.49 kg/m²   Temp (24hrs), Av.4 °F (36.9 °C), Min:98 °F (36.7 °C), Max:98.7 °F (37.1 °C)       Intake/Output Summary (Last 24 hours) at 10/10/2024 0829  Last data filed at 10/10/2024 0500  Gross per 24 hour   Intake 623 ml   Output 645 ml   Net -22 ml     Wt Readings from Last 3 Encounters:   10/10/24 181 lb 14.4 oz (82.5 kg)   24 220 lb (99.8 kg)   20 186 lb (84.4 kg)     Telemetry: BiV paced 70 bpm  General: Alert and oriented in no apparent distress lying comfortably and flat in bed on room air.  HEENT: No focal deficits. RIJ,  Neck: No JVD, carotids 2+ no bruits.  Cardiac: Regular rate and rhythm, S1, S2 normal, 1/6 systolic murmur L apex, rub or gallop.  Lungs: CTA per anterior exam.  Normal excursions and effort.  Abdomen: Soft, non-tender.   Extremities: Without clubbing, cyanosis or edema.  Peripheral pulses are 2+  : catheter removed  Neurologic: Alert and oriented, normal affect.  Skin: Warm and dry.  Sternotomy incision benign. L sided chest wall pacemaker site benign.      Intake/Output:    Intake/Output Summary (Last 24 hours) at 10/10/2024 0829  Last data filed at 10/10/2024 0500  Gross per 24 hour   Intake 623 ml   Output 645 ml   Net -22 ml       Last 3 Weights   10/10/24 0500 181 lb 14.4 oz (82.5 kg)   10/09/24 1820 196 lb 14.4 oz (89.3 kg)   10/09/24 0500 193 lb 12.6 oz (87.9 kg)   10/08/24 0610 182 lb 6.4 oz (82.7 kg)   24 1102 187 lb (84.8  kg)   06/26/24 1350 220 lb (99.8 kg)   08/06/20 1024 186 lb (84.4 kg)       Labs:  Recent Labs   Lab 10/08/24  1144 10/09/24  0412 10/10/24  0451   * 130* 132*   BUN 13 11 16   CREATSERUM 0.79 0.87 1.00   EGFRCR 92 89 78   CA 7.9* 8.1* 8.8    142 136   K 4.4 3.7 4.4  4.4   * 112 109   CO2 24.0 24.0 23.0     Recent Labs   Lab 10/08/24  1144 10/09/24  0412 10/10/24  0451   RBC 4.73 3.95 3.92   HGB 14.7 12.4* 12.2*   HCT 42.8 35.3* 36.3*   MCV 90.5 89.4 92.6   MCH 31.1 31.4 31.1   MCHC 34.3 35.1 33.6   RDW 13.4 13.6 13.7   NEPRELIM  --  10.44*  --    WBC 11.2* 12.0* 13.0*   .0* 123.0* 101.0*         No results for input(s): \"TROP\", \"TROPHS\", \"CK\" in the last 168 hours.    Diagnostics:             Medications:   amiodarone  400 mg Oral Daily    insulin aspart  1-10 Units Subcutaneous TID AC and HS    apixaban  5 mg Oral BID    acetaminophen  1,000 mg Intravenous Q6H    ketorolac  15 mg Intravenous Q6H    atorvastatin  80 mg Oral Nightly    donepezil  10 mg Oral Nightly    ezetimibe  10 mg Oral Nightly    levothyroxine  100 mcg Oral Before breakfast    rOPINIRole  0.5 mg Oral Nightly    tamsulosin  0.4 mg Oral Nightly    sennosides  8.6 mg Oral BID    docusate sodium  100 mg Oral BID    mupirocin  1 Application Nasal BID    aspirin  81 mg Oral Daily    pantoprazole  40 mg Intravenous QAM AC    Or    pantoprazole  40 mg Oral QAM AC      sodium chloride      DOBUTamine Stopped (10/09/24 0645)    nitroGLYCERIN in dextrose 5% Stopped (10/08/24 2110)    norepinephrine      NitroPRUSSide (Nipride) 50 mg in dextrose 5% 250 mL infusion      dextrose 5%-sodium chloride 0.45% 30 mL/hr (10/09/24 0516)    amiodarone Stopped (10/09/24 1500)       Assessment/Plan:  Severe mitral insufficiency s/p Mitral valve replacement with chordal perseveration 33 mm Magna bovine pericardial valve on 10/8/2024  Off pressors and chest tubes removed  Atrial fibrillation hx w/ BiV PPM in place  On IV amiodarone post  operatively briefly - patient declines further amiodarone  S/p MAAZE and KE amputation 10/8  Historically on Eliquis for CVA risk reduction outpatient  Currently V-paced 70's underlying afib  Hx of CAD s/p PCI  No anginal symptoms reported recently  High intensity statin, Zetia, ASA  On BB at home  Leukocytosis  Reactive likely - relatively flat- monitoring  Acute postoperative anemia-expected  Acute mild thrombocytopenia   expected/consumptive  HTN  BP's at goal  HLD  High intensity statin  BPH  On Flomax  Hx of dementia    PLAN  Hemodynamically stable off pressors, C-tubes out, and recovering well  Patient and family would prefer to be off amiodarone - Will discontinue  Continue ASA, statin, Zetia  Reintroduce Eliquis 5 mg BID for CVA risk reduction in afib once OK with CT surgical team  Lasix IV 20 mg once today  Reintroduce BB today and up titrate to home dose as BP allows  APN team discussed with CT surgery          Travis Marques PA-C  10/10/2024  08:49 AM     Patient seen and examined. Agree with the findings and plan of care. Restart Eliquis and BB.  Ok for CTU

## 2024-10-10 NOTE — PLAN OF CARE
Received care of pt circa 1930. Pt resting in bed, a/o x4. V-paced on monitor with frequent PVCs. Pt received on 1 L/min NC. Lung sounds diminished. VSS. Pt c/o mid back/sternal pain. Pain managed with hot packs and scheduled Ofrimev and Toradol. Ambulating occasionally and voiding via urinal. Pt updated on plan of care. Per pt, no further questions. Plan of care continues.

## 2024-10-10 NOTE — PROGRESS NOTES
[facility]   part of [facility]      [facility] Progress Note    Murtaza Hickey Patient Status:  Inpatient    1950 MRN [id number]   [facility] 8NE-A Attending Zurdo Porras MD   Hosp Day # 2 PCP MIL BOSTON MD     Subjective:  Pt resting in bed, looking comfortable nearly lying flat. Pain is 4/10, doesn't ever get better then that. Has taken norco in the past and done well with it.     Objective:  /60   Pulse 70   Temp 99.6 °F (37.6 °C) (Temporal)   Resp 20   Ht 170.2 cm (5' 7\")   Wt 82.5 kg (181 lb 14.4 oz)   SpO2 96%   BMI 28.49 kg/m²          Intake/Output:    Intake/Output Summary (Last 24 hours) at 10/10/2024 1100  Last data filed at 10/10/2024 0500  Gross per 24 hour   Intake 623 ml   Output 575 ml   Net 48 ml       Last 3 Weights   10/10/24 0500 82.5 kg (181 lb 14.4 oz)   10/09/24 1820 89.3 kg (196 lb 14.4 oz)   10/09/24 0500 87.9 kg (193 lb 12.6 oz)   10/08/24 0610 82.7 kg (182 lb 6.4 oz)   24 1102 84.8 kg (187 lb)   24 1350 99.8 kg (220 lb)   20 1024 84.4 kg (186 lb)           Allergies:  Allergies[1]    Current Facility-Administered Medications   Medication Dose Route Frequency    metoprolol tartrate (Lopressor) partial tab 12.5 mg  12.5 mg Oral 2x Daily(Beta Blocker)    furosemide (Lasix) 10 mg/mL injection 40 mg  40 mg Intravenous BID (Diuretic)    HYDROcodone-acetaminophen (Norco) 5-325 MG per tab 1 tablet  1 tablet Oral Q4H PRN    Or    HYDROcodone-acetaminophen (Norco) 5-325 MG per tab 2 tablet  2 tablet Oral Q4H PRN    insulin aspart (NovoLOG) 100 Units/mL FlexPen 1-10 Units  1-10 Units Subcutaneous TID AC and HS    benzocaine-menthol (Cepacol) lozenge 1 lozenge  1 lozenge Oral PRN    apixaban (Eliquis) tab 5 mg  5 mg Oral BID    ketorolac (Toradol) 15 MG/ML injection 15 mg  15 mg Intravenous Q6H    atorvastatin (Lipitor) tab 80 mg  80 mg Oral Nightly    donepezil (Aricept) tab 10 mg  10 mg Oral Nightly    ezetimibe (Zetia) tab 10 mg  10  mg Oral Nightly    levothyroxine (Synthroid) tab 100 mcg  100 mcg Oral Before breakfast    rOPINIRole (Requip) tab 0.5 mg  0.5 mg Oral Nightly    tamsulosin (Flomax) cap 0.4 mg  0.4 mg Oral Nightly    sodium chloride 0.9% infusion   Intravenous Continuous    melatonin tab 3 mg  3 mg Oral Nightly PRN    sennosides (Senokot) tab 8.6 mg  8.6 mg Oral BID    docusate sodium (Colace) cap 100 mg  100 mg Oral BID    polyethylene glycol (PEG 3350) (Miralax) 17 g oral packet 17 g  17 g Oral Daily PRN    bisacodyl (Dulcolax) 10 MG rectal suppository 10 mg  10 mg Rectal Daily PRN    ondansetron (Zofran) 4 MG/2ML injection 4 mg  4 mg Intravenous Q6H PRN    DOBUTamine in dextrose 5% (Dobutrex) 500 mg/250mL infusion premix  2.5-20 mcg/kg/min (Dosing Weight) Intravenous Continuous PRN    nitroGLYCERIN in dextrose 5% 50 mg/250mL infusion premix  5-300 mcg/min Intravenous Continuous PRN    norepinephrine (Levophed) 4 mg/250mL infusion premix  0.5-30 mcg/min Intravenous Continuous PRN    NitroPRUSSide (Nipride) 50 mg in dextrose 5% 250 mL infusion  0.1-4 mcg/kg/min (Dosing Weight) Intravenous Continuous PRN    potassium chloride 20 mEq/100mL IVPB premix 20 mEq  20 mEq Intravenous PRN    Or    potassium chloride 40 mEq/100mL IVPB premix (central line) 40 mEq  40 mEq Intravenous PRN    calcium gluconate 3 g in sodium chloride 0.9% 100 mL IVPB  3 g Intravenous PRN    magnesium sulfate in dextrose 5% 1 g/100mL infusion premix 1 g  1 g Intravenous PRN    magnesium sulfate in sterile water for injection 2 g/50mL IVPB premix 2 g  2 g Intravenous PRN    mupirocin (Bactroban) 2% nasal ointment 1 Application  1 Application Nasal BID    dextrose 5%-sodium chloride 0.45% infusion   mL/hr Intravenous Continuous    morphINE PF 2 MG/ML injection 2 mg  2 mg Intravenous Q2H PRN    Or    morphINE PF 4 MG/ML injection 4 mg  4 mg Intravenous Q2H PRN    aspirin DR tab 81 mg  81 mg Oral Daily    pantoprazole (Protonix) 40 mg in sodium chloride 0.9%  PF 10 mL IV push  40 mg Intravenous QAM AC    Or    pantoprazole (Protonix) DR tab 40 mg  40 mg Oral QAM AC       Labs:  Lab Results   Component Value Date    WBC 13.0 10/10/2024    HGB 12.2 10/10/2024    HCT 36.3 10/10/2024    .0 10/10/2024    CREATSERUM 1.00 10/10/2024    BUN 16 10/10/2024     10/10/2024    K 4.4 10/10/2024    K 4.4 10/10/2024     10/10/2024    CO2 23.0 10/10/2024     10/10/2024    CA 8.8 10/10/2024       Chest Xray: none today       Physical Exam:    Neuro: alert and oriented. Intact. NAD   Lungs: CTA. Diminished. IS 1000  Heart: s1s2 RRR paced on monitor. Sternum stable   Abdomen: soft, nontender + bowel sounds. Passing gas   Extremities:+pulses. Trace edema     Assessment/Plan:  Patient Active Problem List   Diagnosis    Hx of colonic polyps    Diverticulosis of sigmoid colon    Hyperlipidemia    Hypertension, essential    Congestive heart disease (HCC)    Atherosclerosis of coronary artery    Atrial fibrillation (HCC)    Arthritis    History of hemorrhoids    Cardiac pacemaker in situ    Mitral valve insufficiency    Hyperglycemia    Leukocytosis       POD# 2 Mitral valve replacement, modified maze and amputation of left atrial appendage   - HD stable off gtts. Delined, including pacer wires. Pt has PPM   - PAF, restarted Eliquis as at home. Amio per cardiology   - neuro intact  - post op anemia/ TCP, stable/ Monitor  - leukocytosis, likely reactive. Monitor  - renal function intact, adequate urine output   - fluid overload, lasix ordered.   - pain management, add Norco and Toradol    - PPX: TEDs/ SCDs/ Protonix   - Encourage deep breathing, coughing, I.S.   - Increase activity. As able today. PT/OT/ cardiac rehab   - ok to transfer to tele     D/W Dr.Foy Monica VILLALOBOS RN  10/10/2024  11:00 AM         [1]   Allergies  Allergen Reactions    Rivaroxaban BLEEDING

## 2024-10-10 NOTE — PROGRESS NOTES
[facility]   part of [facility]     Hospitalist Progress Note     Murtaza Hickey Patient Status:  Inpatient    1950 MRN [id number]   Location [facility] 6NE-A Attending Zurdo Porras MD   Hosp Day # 2 PCP MIL BOSTON MD     Reason for consult: Medical management     Requested by: Dr. Zurdo Porras    Subjective:     Feeling well; ambulated in halls earlier with no issues    Objective:    Review of Systems:   A comprehensive review of systems was completed; pertinent positive and negatives stated in subjective.    Vital signs:  Temp:  [97.6 °F (36.4 °C)-99.6 °F (37.6 °C)] 97.6 °F (36.4 °C)  Pulse:  [70-75] 70  Resp:  [15-26] 18  BP: (105-140)/(60-83) 111/76  SpO2:  [91 %-99 %] 93 %    Physical Exam:    General: No acute distress  Respiratory: No wheezes, no rhonchi  Cardiovascular: S1, S2, regular rate and rhythm. Surgical incision with bandage c/d/I,   Abdomen: Soft, Non-tender, non-distended, positive bowel sounds  Neuro: No new focal deficits.   Extremities: No edema    Diagnostic Data:    Labs:  Recent Labs   Lab 10/08/24  1144 10/09/24  0412 10/10/24  0451   WBC 11.2* 12.0* 13.0*   HGB 14.7 12.4* 12.2*   MCV 90.5 89.4 92.6   .0* 123.0* 101.0*   INR 1.50*  --   --        Recent Labs   Lab 10/08/24  1144 10/09/24  0412 10/10/24  0451   * 130* 132*   BUN 13 11 16   CREATSERUM 0.79 0.87 1.00   CA 7.9* 8.1* 8.8    142 136   K 4.4 3.7 4.4  4.4   * 112 109   CO2 24.0 24.0 23.0       Estimated Creatinine Clearance: 58.8 mL/min (based on SCr of 1 mg/dL).    No results for input(s): \"TROP\", \"TROPHS\", \"CK\" in the last 168 hours.    Recent Labs   Lab 10/08/24  1144   PTP 18.3*   INR 1.50*        Microbiology    No results found for this visit on 10/08/24.      Imaging: Reviewed in Epic.    Medications:    metoprolol tartrate  12.5 mg Oral 2x Daily(Beta Blocker)    furosemide  40 mg Intravenous BID (Diuretic)    insulin aspart  1-10 Units Subcutaneous TID AC and HS     apixaban  5 mg Oral BID    ketorolac  15 mg Intravenous Q6H    atorvastatin  80 mg Oral Nightly    donepezil  10 mg Oral Nightly    ezetimibe  10 mg Oral Nightly    levothyroxine  100 mcg Oral Before breakfast    rOPINIRole  0.5 mg Oral Nightly    tamsulosin  0.4 mg Oral Nightly    sennosides  8.6 mg Oral BID    docusate sodium  100 mg Oral BID    mupirocin  1 Application Nasal BID    aspirin  81 mg Oral Daily    pantoprazole  40 mg Intravenous QAM AC    Or    pantoprazole  40 mg Oral QAM AC       Assessment & Plan:      #Severe mitral regurgitation  #Atrial fibrillation on Eliquis s/p pacemaker  -S/p mitral valve replacement, KE amputation and MAZE procedure 10/8/2024  -CV surgery primary  -Cardiology following  -inotropes off  -PO amiodarone  -ASA, statin, metoprolol   -PT/OT  -resume PTA Eliquis when okay with primary/surgical team     #Stress hyperglycemia  -A1c is 5.1  -blood sugars controlled > can start hyperglycemia protocol if persistently > 180     #CAD s/p LAD stent  -ASA, statin     #Hypertension  -metoprolol     #Dyslipidemia  -Statin, zetia     #Hypothyroidism  -Levothyroxine     #BPH  -Flomax     #RLS  -Ropinirole     #Dementia  -Donepezil     #Reactive leukocytosis  #Thrombocytopenia, likely consumptive  -Monitor CBC      Delmis Turpin DO    Supplementary Documentation:     Quality:  DVT Mechanical Prophylaxis: TUCKER hose, LETICIAs,    DVT Pharmacologic Prophylaxis   Medication    apixaban (Eliquis) tab 5 mg         DVT Pharmacologic prophylaxis: Aspirin 81 mg      Code Status: Not on file  Camacho: No urinary catheter in place  Camacho Duration (in days): 2    The 21st Century Cures Act makes medical notes like these available to patients in the interest of transparency. Please be advised this is a medical document. Medical documents are intended to carry relevant information, facts as evident, and the clinical opinion of the practitioner. The medical note is intended as peer to peer communication and  may appear blunt or direct. It is written in medical language and may contain abbreviations or verbiage that are unfamiliar.

## 2024-10-11 LAB
ANION GAP SERPL CALC-SCNC: 6 MMOL/L (ref 0–18)
BASE EXCESS BLD CALC-SCNC: -4 MMOL/L
BASE EXCESS BLDA CALC-SCNC: -1 MMOL/L (ref ?–30)
BLOOD TYPE BARCODE: 6200
BUN BLD-MCNC: 20 MG/DL (ref 9–23)
CA-I BLD-SCNC: 1.16 MMOL/L (ref 1.12–1.32)
CA-I BLDA-SCNC: 1.11 MMOL/L (ref 1.12–1.32)
CALCIUM BLD-MCNC: 8.3 MG/DL (ref 8.7–10.4)
CHLORIDE SERPL-SCNC: 103 MMOL/L (ref 98–112)
CO2 BLD-SCNC: 24 MMOL/L (ref 22–32)
CO2 BLDA-SCNC: 28 MMOL/L (ref 22–32)
CO2 SERPL-SCNC: 26 MMOL/L (ref 21–32)
CREAT BLD-MCNC: 0.94 MG/DL
EGFRCR SERPLBLD CKD-EPI 2021: 84 ML/MIN/1.73M2 (ref 60–?)
ERYTHROCYTE [DISTWIDTH] IN BLOOD BY AUTOMATED COUNT: 13.3 %
GLUCOSE BLD-MCNC: 111 MG/DL (ref 70–99)
GLUCOSE BLD-MCNC: 112 MG/DL (ref 70–99)
GLUCOSE BLD-MCNC: 117 MG/DL (ref 70–99)
GLUCOSE BLD-MCNC: 118 MG/DL (ref 70–99)
GLUCOSE BLD-MCNC: 130 MG/DL (ref 70–99)
GLUCOSE BLD-MCNC: 150 MG/DL (ref 70–99)
GLUCOSE BLDA-MCNC: 145 MG/DL (ref 70–99)
HCO3 BLD-SCNC: 22.6 MEQ/L
HCO3 BLDA-SCNC: 26 MEQ/L (ref 22–26)
HCT VFR BLD AUTO: 31.7 %
HCT VFR BLD CALC: 28 %
HCT VFR BLDA CALC: 29 %
HGB BLD-MCNC: 11 G/DL
ISTAT PATIENT TEMPERATURE: 28 DEGREE
MCH RBC QN AUTO: 30.8 PG (ref 26–34)
MCHC RBC AUTO-ENTMCNC: 34.7 G/DL (ref 31–37)
MCV RBC AUTO: 88.8 FL
OSMOLALITY SERPL CALC.SUM OF ELEC: 284 MOSM/KG (ref 275–295)
PCO2 BLD: 43.8 MMHG
PCO2 BLDA: 39.2 MMHG (ref 35–45)
PH BLD: 7.32 [PH]
PH BLDA: 7.38 [PH] (ref 7.35–7.45)
PLATELET # BLD AUTO: 93 10(3)UL (ref 150–450)
PO2 BLD: 241 MMHG
PO2 BLDA: >400 MMHG (ref 80–105)
POTASSIUM BLD-SCNC: 4.4 MMOL/L (ref 3.6–5.1)
POTASSIUM SERPL-SCNC: 3.9 MMOL/L (ref 3.5–5.1)
RBC # BLD AUTO: 3.57 X10(6)UL
SAO2 % BLD: 100 %
SAO2 % BLDA: 100 % (ref 92–100)
SODIUM BLD-SCNC: 143 MMOL/L (ref 136–145)
SODIUM BLDA-SCNC: 140 MMOL/L (ref 136–145)
SODIUM BLDA-SCNC: 5.1 MMOL/L (ref 3.6–5.1)
SODIUM SERPL-SCNC: 135 MMOL/L (ref 136–145)
UNIT VOLUME: 350 ML
WBC # BLD AUTO: 8.7 X10(3) UL (ref 4–11)

## 2024-10-11 PROCEDURE — 99232 SBSQ HOSP IP/OBS MODERATE 35: CPT | Performed by: INTERNAL MEDICINE

## 2024-10-11 RX ORDER — AMIODARONE HYDROCHLORIDE 200 MG/1
200 TABLET ORAL 2 TIMES DAILY WITH MEALS
Status: DISCONTINUED | OUTPATIENT
Start: 2024-10-11 | End: 2024-10-11

## 2024-10-11 RX ORDER — HYDROCODONE BITARTRATE AND ACETAMINOPHEN 5; 325 MG/1; MG/1
1-2 TABLET ORAL EVERY 6 HOURS PRN
Qty: 60 TABLET | Refills: 0 | Status: SHIPPED | OUTPATIENT
Start: 2024-10-11

## 2024-10-11 NOTE — PROGRESS NOTES
[facility]   part of [facility]      [facility] Progress Note    Murtaza Hickey Patient Status:  Inpatient    1950 MRN [id number]   Location [facility] 8NE-A Attending Zudro Porras MD   Hosp Day # 3 PCP MIL BOSTON MD     Subjective:  Feeling pretty good, walking about 100 yards     Objective:  /75 (BP Location: Right arm)   Pulse 70   Temp 98.2 °F (36.8 °C) (Oral)   Resp 20   Ht 170.2 cm (5' 7\")   Wt 89.1 kg (196 lb 6.4 oz)   SpO2 95%   BMI 30.76 kg/m²          Intake/Output:    Intake/Output Summary (Last 24 hours) at 10/11/2024 1126  Last data filed at 10/11/2024 0900  Gross per 24 hour   Intake 500 ml   Output 1900 ml   Net -1400 ml       Last 3 Weights   10/11/24 0900 89.1 kg (196 lb 6.4 oz)   10/11/24 0530 84.4 kg (186 lb 1.6 oz)   10/10/24 0500 82.5 kg (181 lb 14.4 oz)   10/09/24 1820 89.3 kg (196 lb 14.4 oz)   10/09/24 0500 87.9 kg (193 lb 12.6 oz)   10/08/24 0610 82.7 kg (182 lb 6.4 oz)   24 1102 84.8 kg (187 lb)   24 1350 99.8 kg (220 lb)   20 1024 84.4 kg (186 lb)       Allergies:  Allergies[1]    Current Facility-Administered Medications   Medication Dose Route Frequency    metoprolol tartrate (Lopressor) partial tab 12.5 mg  12.5 mg Oral 2x Daily(Beta Blocker)    furosemide (Lasix) 10 mg/mL injection 40 mg  40 mg Intravenous BID (Diuretic)    HYDROcodone-acetaminophen (Norco) 5-325 MG per tab 1 tablet  1 tablet Oral Q4H PRN    Or    HYDROcodone-acetaminophen (Norco) 5-325 MG per tab 2 tablet  2 tablet Oral Q4H PRN    insulin aspart (NovoLOG) 100 Units/mL FlexPen 1-10 Units  1-10 Units Subcutaneous TID AC and HS    benzocaine-menthol (Cepacol) lozenge 1 lozenge  1 lozenge Oral PRN    apixaban (Eliquis) tab 5 mg  5 mg Oral BID    ketorolac (Toradol) 15 MG/ML injection 15 mg  15 mg Intravenous Q6H    atorvastatin (Lipitor) tab 80 mg  80 mg Oral Nightly    donepezil (Aricept) tab 10 mg  10 mg Oral Nightly    ezetimibe (Zetia) tab 10 mg  10 mg Oral  Nightly    levothyroxine (Synthroid) tab 100 mcg  100 mcg Oral Before breakfast    rOPINIRole (Requip) tab 0.5 mg  0.5 mg Oral Nightly    tamsulosin (Flomax) cap 0.4 mg  0.4 mg Oral Nightly    sodium chloride 0.9% infusion   Intravenous Continuous    melatonin tab 3 mg  3 mg Oral Nightly PRN    sennosides (Senokot) tab 8.6 mg  8.6 mg Oral BID    docusate sodium (Colace) cap 100 mg  100 mg Oral BID    polyethylene glycol (PEG 3350) (Miralax) 17 g oral packet 17 g  17 g Oral Daily PRN    bisacodyl (Dulcolax) 10 MG rectal suppository 10 mg  10 mg Rectal Daily PRN    ondansetron (Zofran) 4 MG/2ML injection 4 mg  4 mg Intravenous Q6H PRN    DOBUTamine in dextrose 5% (Dobutrex) 500 mg/250mL infusion premix  2.5-20 mcg/kg/min (Dosing Weight) Intravenous Continuous PRN    nitroGLYCERIN in dextrose 5% 50 mg/250mL infusion premix  5-300 mcg/min Intravenous Continuous PRN    norepinephrine (Levophed) 4 mg/250mL infusion premix  0.5-30 mcg/min Intravenous Continuous PRN    NitroPRUSSide (Nipride) 50 mg in dextrose 5% 250 mL infusion  0.1-4 mcg/kg/min (Dosing Weight) Intravenous Continuous PRN    potassium chloride 20 mEq/100mL IVPB premix 20 mEq  20 mEq Intravenous PRN    Or    potassium chloride 40 mEq/100mL IVPB premix (central line) 40 mEq  40 mEq Intravenous PRN    calcium gluconate 3 g in sodium chloride 0.9% 100 mL IVPB  3 g Intravenous PRN    magnesium sulfate in dextrose 5% 1 g/100mL infusion premix 1 g  1 g Intravenous PRN    magnesium sulfate in sterile water for injection 2 g/50mL IVPB premix 2 g  2 g Intravenous PRN    mupirocin (Bactroban) 2% nasal ointment 1 Application  1 Application Nasal BID    dextrose 5%-sodium chloride 0.45% infusion   mL/hr Intravenous Continuous    morphINE PF 2 MG/ML injection 2 mg  2 mg Intravenous Q2H PRN    Or    morphINE PF 4 MG/ML injection 4 mg  4 mg Intravenous Q2H PRN    aspirin DR tab 81 mg  81 mg Oral Daily    pantoprazole (Protonix) 40 mg in sodium chloride 0.9% PF 10 mL  IV push  40 mg Intravenous QAM AC    Or    pantoprazole (Protonix) DR tab 40 mg  40 mg Oral QAM AC       Labs:  Lab Results   Component Value Date    WBC 8.7 10/11/2024    HGB 11.0 10/11/2024    HCT 31.7 10/11/2024    PLT 93.0 10/11/2024    CREATSERUM 0.94 10/11/2024    BUN 20 10/11/2024     10/11/2024    K 3.9 10/11/2024     10/11/2024    CO2 26.0 10/11/2024     10/11/2024    CA 8.3 10/11/2024       Chest Xray: none today       Physical Exam:    Neuro: alert and oriented. Intact. NAD   Lungs: CTA. Diminished. IS 1000  Heart: s1s2 RRR paced on monitor. Sternum stable   Abdomen: soft, nontender + bowel sounds. Passing gas no BM yet   Extremities:+pulses. Trace edema      Assessment/Plan:  Patient Active Problem List   Diagnosis    Hx of colonic polyps    Diverticulosis of sigmoid colon    Hyperlipidemia    Hypertension, essential    Congestive heart disease (HCC)    Atherosclerosis of coronary artery    Atrial fibrillation (HCC)    Arthritis    History of hemorrhoids    Cardiac pacemaker in situ    Mitral valve insufficiency    Hyperglycemia    Leukocytosis       POD# 3 Mitral valve replacement, modified maze and amputation of left atrial appendage   - HD stable  - neuro intact   - PW are out, pt has PPM   - PAF, restarted Eliquis as at home.  - post op anemia/ TCP, stable/ Monitor  - leukocytosis, WBC trending down. Monitor  - renal function intact, good urine output   - fluid overload, lasix ordered.   - pain management, Norco    - PPX: TEDs/ SCDs/ Protonix   - Encourage deep breathing, coughing, I.S.   - Increase activity. As able today. PT/OT/ cardiac rehab   - discharge planning, thinking home 1-2 days     D/W Dr.Goodwin Monica VILLALOBOS, RN  10/11/2024  11:26 AM         [1]   Allergies  Allergen Reactions    Rivaroxaban BLEEDING

## 2024-10-11 NOTE — PHYSICAL THERAPY NOTE
PHYSICAL THERAPY TREATMENT NOTE - INPATIENT    Room Number: 8602/8602-A     Session: 1     Number of Visits to Meet Established Goals: 4    Presenting Problem: s/p  mitral valve replacement, KE amputation and MAZE  10/8  Co-Morbidities : atrial fibrillation on Eliquis s/p PPM, severe mitral regurgitation, CAD s/p LAD stent, hypertension, dyslipidemia, hypothyroidism, BPH, RLS and dementia    PHYSICAL THERAPY ASSESSMENT   Patient demonstrates good  progress this session, goals  updated to reflect patient performance.      Patient is requiring supervision as a result of the following impairments: decreased endurance/aerobic capacity, decreased muscular endurance, and medical status.     Patient continues to function below baseline with bed mobility, transfers, gait, and stair negotiation.  Next session anticipate patient to progress gait and stair negotiation.  Physical Therapy will continue to follow patient for duration of hospitalization.    Patient continues to benefit from continued skilled PT services: at discharge to promote prior level of function.  Anticipate patient will return home with home health PT.    PLAN DURING HOSPITALIZATION  Nursing Mobility Recommendation : 1 Assist  PT Device Recommendation: None  PT Treatment Plan: Bed mobility;Body mechanics;Coordination;Endurance;Energy conservation;Patient education;Family education;Gait training;Neuromuscular re-educate;Range of motion;Strengthening;Stoop training;Stair training;Transfer training;Balance training  Frequency (Obs): 3-5x/week     CURRENT GOALS       Goal #1 Patient is able to demonstrate supine - sit EOB @ level: supervision      Goal #2 Patient is able to demonstrate transfers EOB to/from Chair/Wheelchair at assistance level: supervision  Met       Goal #3 Patient is able to ambulate 150 feet with assist device: walker - rolling at assistance level: supervision  Met       Goal #4 Patient will navigate 3 stairs with rail and SBA  Progressing     Goal #5     Goal #6     Goal Comments: Goals established on 10/9/2024  10/11/2024 all goals ongoing     SUBJECTIVE  \"I want to make it to the end of the jennings and back\"     OBJECTIVE  Precautions: Cardiac;Sternal    WEIGHT BEARING RESTRICTION     PAIN ASSESSMENT   Ratin  Location: sternal  Management Techniques: Relaxation;Breathing techniques;Body mechanics    BALANCE                                                                                                                       Static Sitting: Good  Dynamic Sitting: Good           Static Standing: Fair -  Dynamic Standing: Fair -    ACTIVITY TOLERANCE                         O2 WALK       AM-PAC '6-Clicks' INPATIENT SHORT FORM - BASIC MOBILITY  How much difficulty does the patient currently have...  Patient Difficulty: Turning over in bed (including adjusting bedclothes, sheets and blankets)?: None   Patient Difficulty: Sitting down on and standing up from a chair with arms (e.g., wheelchair, bedside commode, etc.): None   Patient Difficulty: Moving from lying on back to sitting on the side of the bed?: None   How much help from another person does the patient currently need...   Help from Another: Moving to and from a bed to a chair (including a wheelchair)?: A Little   Help from Another: Need to walk in hospital room?: A Little   Help from Another: Climbing 3-5 steps with a railing?: A Little     AM-PAC Score:  Raw Score: 21   Approx Degree of Impairment: 28.97%   Standardized Score (AM-PAC Scale): 50.25   CMS Modifier (G-Code): CJ    FUNCTIONAL ABILITY STATUS  Gait Assessment   Functional Mobility/Gait Assessment  Gait Assistance: Supervision  Distance (ft): 225  Assistive Device: Rolling walker  Pattern: Shuffle  Stairs:  (pt politely refused stair training)    Skilled Therapy Provided  Pt presents seated in BS Chair   Pt gait trained in room [facility] AD, pt reaching for walls, SBA at toilet standing in attempt to urinate.   Therapist provided pt with  RW and gait trained in halls.   Pt politely refusing stair training.   Pt requesting to return to bed. Pt cued for log roll c good return demo.  Therapist educated pt on body mechanics and lifting /sternal precautions.   Bed Mobility:  Rolling: ind    Supine<>Sit: nt    Sit<>Supine: mod I via log roll      Transfer Mobility:  Sit<>Stand: mod I    Stand<>Sit: supervision    Gait: supervision c RW     Therapist's Comments: pt could benefit from RW at d/c, will have PT aide vend .           Patient End of Session: In bed;Needs met;Call light within reach;RN aware of session/findings;All patient questions and concerns addressed;Alarm set    PT Session Time: 25 minutes  Gait Training: 15 minutes  Therapeutic Activity: 10 minutes  Therapeutic Exercise:  minutes   Neuromuscular Re-education:  minutes

## 2024-10-11 NOTE — PROGRESS NOTES
[facility]  Progress Note    Murtaza Hickey Patient Status:  Inpatient    1950 MRN [id number]   Location [facility] 8NE-A Attending Zurdo Porras MD   Hosp Day # 3 PCP MIL BOSTON MD       Assessment:    MR POD 3 MVR  Afib  CAD with hx PCI  HTN  Hyperlipidemia    Plan:     Cont amio and Eliquis  Cont current meds  Home soon    Subjective:  No chest pain or shortness of breath.    Objective:  /75 (BP Location: Right arm)   Pulse 70   Temp 98.8 °F (37.1 °C) (Oral)   Resp 19   Ht 5' 7\" (1.702 m)   Wt 186 lb 1.6 oz (84.4 kg)   SpO2 95%   BMI 29.15 kg/m²     Temp (24hrs), Av.2 °F (36.8 °C), Min:97.4 °F (36.3 °C), Max:99.6 °F (37.6 °C)      Tele  Paced    Intake/Output:    Intake/Output Summary (Last 24 hours) at 10/11/2024 0725  Last data filed at 10/10/2024 1907  Gross per 24 hour   Intake 440 ml   Output 1400 ml   Net -960 ml       Last 3 Weights   10/11/24 0530 186 lb 1.6 oz (84.4 kg)   10/10/24 0500 181 lb 14.4 oz (82.5 kg)   10/09/24 1820 196 lb 14.4 oz (89.3 kg)   10/09/24 0500 193 lb 12.6 oz (87.9 kg)   10/08/24 0610 182 lb 6.4 oz (82.7 kg)   24 1102 187 lb (84.8 kg)   24 1350 220 lb (99.8 kg)   20 1024 186 lb (84.4 kg)               Physical Exam:    Gen: alert, oriented x 3, NAD  Heent/neck: no jvd  Cardiac: regular rate and rhythm, normal S1,S2  Lungs: CTA  Abd: soft, NT/ND +bs  Ext: no edema      Labs:  Lab Results   Component Value Date    WBC 8.7 10/11/2024    HGB 11.0 10/11/2024    HCT 31.7 10/11/2024    PLT 93.0 10/11/2024    CREATSERUM 0.94 10/11/2024    BUN 20 10/11/2024     10/11/2024    K 3.9 10/11/2024     10/11/2024    CO2 26.0 10/11/2024     10/11/2024    CA 8.3 10/11/2024       Medications:     amiodarone  200 mg Oral BID with meals    metoprolol tartrate  12.5 mg Oral 2x Daily(Beta Blocker)    furosemide  40 mg Intravenous BID (Diuretic)    insulin aspart  1-10 Units Subcutaneous TID AC and HS    apixaban  5 mg Oral BID     ketorolac  15 mg Intravenous Q6H    atorvastatin  80 mg Oral Nightly    donepezil  10 mg Oral Nightly    ezetimibe  10 mg Oral Nightly    levothyroxine  100 mcg Oral Before breakfast    rOPINIRole  0.5 mg Oral Nightly    tamsulosin  0.4 mg Oral Nightly    sennosides  8.6 mg Oral BID    docusate sodium  100 mg Oral BID    mupirocin  1 Application Nasal BID    aspirin  81 mg Oral Daily    pantoprazole  40 mg Intravenous QAM AC    Or    pantoprazole  40 mg Oral QAM AC      sodium chloride      DOBUTamine Stopped (10/09/24 0645)    nitroGLYCERIN in dextrose 5% Stopped (10/08/24 2110)    norepinephrine      NitroPRUSSide (Nipride) 50 mg in dextrose 5% 250 mL infusion      dextrose 5%-sodium chloride 0.45% 30 mL/hr (10/09/24 0516)         Angus Cárdenas MD  10/11/2024  7:25 AM

## 2024-10-11 NOTE — CARDIAC REHAB
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

## 2024-10-11 NOTE — PLAN OF CARE
Assumed care of patient at 1930. Aox4, RA, V-paced. Reports sternal pain to surgical site. Up ad fouzia.   Bed locked and in lowest position. Call light and personal items within reach.      -Surgical site to sternum: Steristripes intact, betadine applied. Mild redness and swelling above upper aspect of incision.   --ABD pressure dressing CDI     Problem: CARDIOVASCULAR - ADULT  Goal: Maintains optimal cardiac output and hemodynamic stability  Description: INTERVENTIONS:  - Monitor vital signs, rhythm, and trends  - Monitor for bleeding, hypotension and signs of decreased cardiac output  - Evaluate effectiveness of vasoactive medications to optimize hemodynamic stability  - Monitor arterial and/or venous puncture sites for bleeding and/or hematoma  - Assess quality of pulses, skin color and temperature  - Assess for signs of decreased coronary artery perfusion - ex. Angina  - Evaluate fluid balance, assess for edema, trend weights  Outcome: Progressing  Goal: Absence of cardiac arrhythmias or at baseline  Description: INTERVENTIONS:  - Continuous cardiac monitoring, monitor vital signs, obtain 12 lead EKG if indicated  - Evaluate effectiveness of antiarrhythmic and heart rate control medications as ordered  - Initiate emergency measures for life threatening arrhythmias  - Monitor electrolytes and administer replacement therapy as ordered  Outcome: Progressing     Problem: PAIN - ADULT  Goal: Verbalizes/displays adequate comfort level or patient's stated pain goal  Description: INTERVENTIONS:  - Encourage pt to monitor pain and request assistance  - Assess pain using appropriate pain scale  - Administer analgesics based on type and severity of pain and evaluate response  - Implement non-pharmacological measures as appropriate and evaluate response  - Consider cultural and social influences on pain and pain management  - Manage/alleviate anxiety  - Utilize distraction and/or relaxation techniques  - Monitor for opioid  side effects  - Notify MD/LIP if interventions unsuccessful or patient reports new pain  - Anticipate increased pain with activity and pre-medicate as appropriate  Outcome: Progressing     Problem: SAFETY ADULT - FALL  Goal: Free from fall injury  Description: INTERVENTIONS:  - Assess pt frequently for physical needs  - Identify cognitive and physical deficits and behaviors that affect risk of falls.  - [place] fall precautions as indicated by assessment.  - Educate pt/family on patient safety including physical limitations  - Instruct pt to call for assistance with activity based on assessment  - Modify environment to reduce risk of injury  - Provide assistive devices as appropriate  - Consider OT/PT consult to assist with strengthening/mobility  - Encourage toileting schedule  Outcome: Progressing

## 2024-10-11 NOTE — PROGRESS NOTES
[facility]   part of [facility]     Hospitalist Progress Note     Murtaza Hickey Patient Status:  Inpatient    1950 MRN [id number]   Location [facility] 6NE-A Attending Zurdo Porras MD   Hosp Day # 3 PCP MIL BOSTON MD     Reason for consult: Medical management     Requested by: Dr. Zurdo Porras    Subjective:     Feeling well, ambulated multiple times yesterday. Slept much better overnight out of the ICU    Objective:    Review of Systems:   A comprehensive review of systems was completed; pertinent positive and negatives stated in subjective.    Vital signs:  Temp:  [97.4 °F (36.3 °C)-99.6 °F (37.6 °C)] 98.8 °F (37.1 °C)  Pulse:  [70-84] 70  Resp:  [15-23] 19  BP: (109-131)/(60-79) 115/75  SpO2:  [92 %-97 %] 95 %    Physical Exam:    General: No acute distress  Respiratory: No wheezes, no rhonchi  Cardiovascular: S1, S2, regular rate and rhythm. Surgical incision with bandage c/d/I,   Abdomen: Soft, Non-tender, non-distended, positive bowel sounds  Neuro: No new focal deficits.   Extremities: No edema    Diagnostic Data:    Labs:  Recent Labs   Lab 10/08/24  1144 10/09/24  0412 10/10/24  0451 10/11/24  0441   WBC 11.2* 12.0* 13.0* 8.7   HGB 14.7 12.4* 12.2* 11.0*   MCV 90.5 89.4 92.6 88.8   .0* 123.0* 101.0* 93.0*   INR 1.50*  --   --   --        Recent Labs   Lab 10/09/24  0412 10/10/24  0451 10/11/24  0441   * 132* 117*   BUN 11 16 20   CREATSERUM 0.87 1.00 0.94   CA 8.1* 8.8 8.3*    136 135*   K 3.7 4.4  4.4 3.9    109 103   CO2 24.0 23.0 26.0       Estimated Creatinine Clearance: 62.5 mL/min (based on SCr of 0.94 mg/dL).    No results for input(s): \"TROP\", \"TROPHS\", \"CK\" in the last 168 hours.    Recent Labs   Lab 10/08/24  1144   PTP 18.3*   INR 1.50*        Microbiology    No results found for this visit on 10/08/24.      Imaging: Reviewed in Epic.    Medications:    metoprolol tartrate  12.5 mg Oral 2x Daily(Beta Blocker)    furosemide  40 mg Intravenous  BID (Diuretic)    insulin aspart  1-10 Units Subcutaneous TID AC and HS    apixaban  5 mg Oral BID    ketorolac  15 mg Intravenous Q6H    atorvastatin  80 mg Oral Nightly    donepezil  10 mg Oral Nightly    ezetimibe  10 mg Oral Nightly    levothyroxine  100 mcg Oral Before breakfast    rOPINIRole  0.5 mg Oral Nightly    tamsulosin  0.4 mg Oral Nightly    sennosides  8.6 mg Oral BID    docusate sodium  100 mg Oral BID    mupirocin  1 Application Nasal BID    aspirin  81 mg Oral Daily    pantoprazole  40 mg Intravenous QAM AC    Or    pantoprazole  40 mg Oral QAM AC       Assessment & Plan:      #Severe mitral regurgitation  #Atrial fibrillation on Eliquis s/p pacemaker  -S/p mitral valve replacement, KE amputation and MAZE procedure 10/8/2024  -CV surgery primary  -Cardiology following  -inotropes off  -PO amiodarone  -ASA, statin, metoprolol   -PT/OT  -resume PTA Eliquis when okay with primary/surgical team     #Stress hyperglycemia  -A1c is 5.1  -blood sugars controlled > can start hyperglycemia protocol if persistently > 180     #CAD s/p LAD stent  -ASA, statin     #Hypertension  -metoprolol     #Dyslipidemia  -Statin, zetia     #Hypothyroidism  -Levothyroxine     #BPH  -Flomax     #RLS  -Ropinirole     #Dementia  -Donepezil     #Reactive leukocytosis  #Thrombocytopenia, likely consumptive  -Monitor CBC      Delmis Turpin,     Supplementary Documentation:     Quality:  DVT Mechanical Prophylaxis: TUCKER hose, SCDs,    DVT Pharmacologic Prophylaxis   Medication    apixaban (Eliquis) tab 5 mg         DVT Pharmacologic prophylaxis: Aspirin 81 mg      Code Status: Not on file  Camacho: No urinary catheter in place  Camacho Duration (in days): 2    The 21st Century Cures Act makes medical notes like these available to patients in the interest of transparency. Please be advised this is a medical document. Medical documents are intended to carry relevant information, facts as evident, and the clinical opinion of the  practitioner. The medical note is intended as peer to peer communication and may appear blunt or direct. It is written in medical language and may contain abbreviations or verbiage that are unfamiliar.

## 2024-10-11 NOTE — PLAN OF CARE
Patient A/O x4.Room air. Patient pain level is 4 out of 10 and has been at that level since the procedure. Pain is being controlled with Norco. Patient reports Nausea that is constant. Nausea is being controlled with Zofran.         Surgical site on sternum is mild red and slight swelling is apparent.  has seen the site and verified the site if okay.        Patient is up at fouzia. Call light and personal items within reach  Problem: Patient/Family Goals  Goal: Patient/Family Long Term Goal  Description: Patient's Long Term Goal: Patient will loose 5lbs of water weight over the next week    Interventions:  - Continue lasix 40mg  - See additional Care Plan goals for specific interventions  Outcome: Progressing     Problem: Patient/Family Goals  Goal: Patient/Family Short Term Goal  Description: Patient's Short Term Goal: Patient's pain level will decreases from 4 to 2    Interventions:   - Continue using Narco when needed  - See additional Care Plan goals for specific interventions  Outcome: Progressing

## 2024-10-11 NOTE — OCCUPATIONAL THERAPY NOTE
IP OT attempted. Pt with no ADL concerns of mobility concerns. Pt verbalizing good understanding of sternal precautions and energy conservation/work simplification. OT will sign off.

## 2024-10-12 VITALS
BODY MASS INDEX: 30.01 KG/M2 | OXYGEN SATURATION: 99 % | DIASTOLIC BLOOD PRESSURE: 71 MMHG | TEMPERATURE: 99 F | WEIGHT: 191.19 LBS | HEIGHT: 67 IN | SYSTOLIC BLOOD PRESSURE: 129 MMHG | RESPIRATION RATE: 20 BRPM | HEART RATE: 70 BPM

## 2024-10-12 LAB
ANION GAP SERPL CALC-SCNC: 3 MMOL/L (ref 0–18)
BUN BLD-MCNC: 15 MG/DL (ref 9–23)
CALCIUM BLD-MCNC: 8.3 MG/DL (ref 8.7–10.4)
CHLORIDE SERPL-SCNC: 104 MMOL/L (ref 98–112)
CO2 SERPL-SCNC: 30 MMOL/L (ref 21–32)
CREAT BLD-MCNC: 0.76 MG/DL
EGFRCR SERPLBLD CKD-EPI 2021: 93 ML/MIN/1.73M2 (ref 60–?)
ERYTHROCYTE [DISTWIDTH] IN BLOOD BY AUTOMATED COUNT: 13.2 %
GLUCOSE BLD-MCNC: 100 MG/DL (ref 70–99)
GLUCOSE BLD-MCNC: 104 MG/DL (ref 70–99)
GLUCOSE BLD-MCNC: 113 MG/DL (ref 70–99)
HCT VFR BLD AUTO: 31 %
HGB BLD-MCNC: 10.7 G/DL
MCH RBC QN AUTO: 30.9 PG (ref 26–34)
MCHC RBC AUTO-ENTMCNC: 34.5 G/DL (ref 31–37)
MCV RBC AUTO: 89.6 FL
OSMOLALITY SERPL CALC.SUM OF ELEC: 285 MOSM/KG (ref 275–295)
PLATELET # BLD AUTO: 125 10(3)UL (ref 150–450)
POTASSIUM SERPL-SCNC: 3.9 MMOL/L (ref 3.5–5.1)
RBC # BLD AUTO: 3.46 X10(6)UL
SODIUM SERPL-SCNC: 137 MMOL/L (ref 136–145)
WBC # BLD AUTO: 7.4 X10(3) UL (ref 4–11)

## 2024-10-12 PROCEDURE — 99232 SBSQ HOSP IP/OBS MODERATE 35: CPT | Performed by: INTERNAL MEDICINE

## 2024-10-12 RX ORDER — METOPROLOL TARTRATE 50 MG
25 TABLET ORAL 2 TIMES DAILY
Status: SHIPPED | COMMUNITY
Start: 2024-10-12

## 2024-10-12 RX ORDER — FUROSEMIDE 20 MG/1
40 TABLET ORAL DAILY
Qty: 14 TABLET | Refills: 0 | Status: SHIPPED | OUTPATIENT
Start: 2024-10-12 | End: 2024-10-19

## 2024-10-12 RX ORDER — METOPROLOL TARTRATE 25 MG/1
25 TABLET, FILM COATED ORAL
Status: DISCONTINUED | OUTPATIENT
Start: 2024-10-12 | End: 2024-10-12

## 2024-10-12 RX ORDER — ASPIRIN 81 MG/1
81 TABLET ORAL DAILY
Qty: 30 TABLET | Refills: 0 | Status: SHIPPED | COMMUNITY
Start: 2024-10-13

## 2024-10-12 NOTE — PLAN OF CARE
Assumed care of patient at change of shift, sitting up in chair. AO x4. NSR on tele monitor. O2 sat adequate on RA. Hoping to discharge home today. Plan of care updated. Chair locked. Call light and personal items in reach. All needs met.

## 2024-10-12 NOTE — PROGRESS NOTES
[facility]   part of [facility]     Hospitalist Progress Note     Murtaza Hickey Patient Status:  Inpatient    1950 MRN [id number]   [facility] 6NE-A Attending Zurdo Porras MD   Hosp Day # 4 PCP MIL BOSTON MD     Reason for consult: Medical management     Requested by: Dr. Zurdo Porras    Subjective:     Eating breakfast in chair at bedside. No new complaints. Hoping he can be discharged today    Objective:    Review of Systems:   A comprehensive review of systems was completed; pertinent positive and negatives stated in subjective.    Vital signs:  Temp:  [97.9 °F (36.6 °C)-98.7 °F (37.1 °C)] 97.9 °F (36.6 °C)  Pulse:  [70-77] 70  Resp:  [18-20] 20  BP: (125-160)/(73-83) 133/73  SpO2:  [98 %-100 %] 100 %    Physical Exam:    General: No acute distress  Respiratory: No wheezes, no rhonchi  Cardiovascular: S1, S2, regular rate and rhythm. Surgical incision with bandage c/d/I,   Abdomen: Soft, Non-tender, non-distended, positive bowel sounds  Neuro: No new focal deficits.   Extremities: No edema    Diagnostic Data:    Labs:  Recent Labs   Lab 10/08/24  1144 10/09/24  0412 10/10/24  0451 10/11/24  0441 10/12/24  0449   WBC 11.2* 12.0* 13.0* 8.7 7.4   HGB 14.7 12.4* 12.2* 11.0* 10.7*   MCV 90.5 89.4 92.6 88.8 89.6   .0* 123.0* 101.0* 93.0* 125.0*   INR 1.50*  --   --   --   --        Recent Labs   Lab 10/10/24  0451 10/11/24  0441 10/12/24  0449   * 117* 104*   BUN 16 20 15   CREATSERUM 1.00 0.94 0.76   CA 8.8 8.3* 8.3*    135* 137   K 4.4  4.4 3.9 3.9    103 104   CO2 23.0 26.0 30.0       Estimated Creatinine Clearance: 77.3 mL/min (based on SCr of 0.76 mg/dL).    No results for input(s): \"TROP\", \"TROPHS\", \"CK\" in the last 168 hours.    Recent Labs   Lab 10/08/24  1144   PTP 18.3*   INR 1.50*        Microbiology    No results found for this visit on 10/08/24.      Imaging: Reviewed in Epic.    Medications:    metoprolol tartrate  25 mg Oral 2x Daily(Beta  Blocker)    furosemide  40 mg Intravenous BID (Diuretic)    insulin aspart  1-10 Units Subcutaneous TID AC and HS    apixaban  5 mg Oral BID    atorvastatin  80 mg Oral Nightly    donepezil  10 mg Oral Nightly    ezetimibe  10 mg Oral Nightly    levothyroxine  100 mcg Oral Before breakfast    rOPINIRole  0.5 mg Oral Nightly    tamsulosin  0.4 mg Oral Nightly    sennosides  8.6 mg Oral BID    docusate sodium  100 mg Oral BID    mupirocin  1 Application Nasal BID    aspirin  81 mg Oral Daily    pantoprazole  40 mg Intravenous QAM AC    Or    pantoprazole  40 mg Oral QAM AC       Assessment & Plan:      #Severe mitral regurgitation  #Atrial fibrillation on Eliquis s/p pacemaker  -S/p mitral valve replacement, KE amputation and MAZE procedure 10/8/2024  -CV surgery primary  -Cardiology following  -inotropes off  -PO amiodarone, ELiquis  -ASA, statin, metoprolol   -stable for discharge to home when cleared by primary service     #Stress hyperglycemia  -A1c is 5.1  -blood sugars controlled > can start hyperglycemia protocol if persistently > 180     #CAD s/p LAD stent  -ASA, statin     #Hypertension  -metoprolol     #Dyslipidemia  -Statin, zetia     #Hypothyroidism  -Levothyroxine     #BPH  -Flomax     #RLS  -Ropinirole     #Dementia  -Donepezil     #Reactive leukocytosis  #Thrombocytopenia, likely consumptive  -Monitor CBC      Delmis Turpin,     Supplementary Documentation:     Quality:  DVT Mechanical Prophylaxis: TUCKER hose, SCDs,    DVT Pharmacologic Prophylaxis   Medication    apixaban (Eliquis) tab 5 mg         DVT Pharmacologic prophylaxis: Aspirin 81 mg      Code Status: Not on file  Camacho: No urinary catheter in place      The 21st Century Cures Act makes medical notes like these available to patients in the interest of transparency. Please be advised this is a medical document. Medical documents are intended to carry relevant information, facts as evident, and the clinical opinion of the practitioner.  The medical note is intended as peer to peer communication and may appear blunt or direct. It is written in medical language and may contain abbreviations or verbiage that are unfamiliar.

## 2024-10-12 NOTE — PROGRESS NOTES
[facility]  Cardiology Progress Note    Murtaza Hickey Patient Status:  Inpatient    1950 MRN [id number]   [facility] 8NE-A Attending Zurdo Porras MD   Hosp Day # 4 PCP MIL BOSTON MD       Subjective: No new events overnight    Objective:   Temp: 98.3 °F (36.8 °C)  Pulse: 72  Resp: 20  BP: 160/83    Intake/Output:     Intake/Output Summary (Last 24 hours) at 10/12/2024 0607  Last data filed at 10/12/2024 0500  Gross per 24 hour   Intake 380 ml   Output 500 ml   Net -120 ml       Last 3 Weights   10/12/24 0500 191 lb 3.2 oz (86.7 kg)   10/11/24 0900 196 lb 6.4 oz (89.1 kg)   10/11/24 0530 186 lb 1.6 oz (84.4 kg)   10/10/24 0500 181 lb 14.4 oz (82.5 kg)   10/09/24 1820 196 lb 14.4 oz (89.3 kg)   10/09/24 0500 193 lb 12.6 oz (87.9 kg)   10/08/24 0610 182 lb 6.4 oz (82.7 kg)   24 1102 187 lb (84.8 kg)   24 1350 220 lb (99.8 kg)   20 1024 186 lb (84.4 kg)           Physical Exam:     General: Alert and oriented x 3. No apparent distress. No respiratory or constitutional distress.  HEENT: Normocephalic, anicteric sclera, neck supple.  Neck: No JVD, carotids 2+, no bruits.  Cardiac: Regular rate and rhythm. S1, S2 normal. No murmur, pericardial rub, S3.  Lungs: Clear without wheezes, rales, rhonchi or dullness.  Normal excursions and effort.  Abdomen: Soft, non-tender. BS-present.  Extremities: Without clubbing, cyanosis or edema.  Peripheral pulses are 2+.  Neurologic: Alert and oriented, normal affect.  Skin: Warm and dry.     Laboratory/Data:    Labs:         Recent Labs   Lab 10/08/24  1144 10/09/24  0412 10/10/24  0451 10/11/24  0441 10/12/24  0449   WBC 11.2* 12.0* 13.0* 8.7 7.4   HGB 14.7 12.4* 12.2* 11.0* 10.7*   MCV 90.5 89.4 92.6 88.8 89.6   .0* 123.0* 101.0* 93.0* 125.0*   INR 1.50*  --   --   --   --        Recent Labs   Lab 10/08/24  1144 10/09/24  0412 10/10/24  0451 10/11/24  0441 10/12/24  0449    142 136 135* 137   K 4.4 3.7 4.4  4.4 3.9  3.9   * 112 109 103 104   CO2 24.0 24.0 23.0 26.0 30.0   BUN 13 11 16 20 15   CREATSERUM 0.79 0.87 1.00 0.94 0.76   CA 7.9* 8.1* 8.8 8.3* 8.3*   MG 2.2 1.9  --   --   --    * 130* 132* 117* 104*       No results for input(s): \"ALT\", \"AST\", \"ALB\", \"AMYLASE\", \"LIPASE\", \"LDH\" in the last 168 hours.    Invalid input(s): \"ALPHOS\", \"TBIL\", \"DBIL\", \"TPROT\"    No results for input(s): \"TROP\" in the last 168 hours.    Allergies:   Allergies[1]    Medications:  Current Facility-Administered Medications   Medication Dose Route Frequency    metoprolol tartrate (Lopressor) partial tab 12.5 mg  12.5 mg Oral 2x Daily(Beta Blocker)    furosemide (Lasix) 10 mg/mL injection 40 mg  40 mg Intravenous BID (Diuretic)    HYDROcodone-acetaminophen (Norco) 5-325 MG per tab 1 tablet  1 tablet Oral Q4H PRN    Or    HYDROcodone-acetaminophen (Norco) 5-325 MG per tab 2 tablet  2 tablet Oral Q4H PRN    insulin aspart (NovoLOG) 100 Units/mL FlexPen 1-10 Units  1-10 Units Subcutaneous TID AC and HS    benzocaine-menthol (Cepacol) lozenge 1 lozenge  1 lozenge Oral PRN    apixaban (Eliquis) tab 5 mg  5 mg Oral BID    atorvastatin (Lipitor) tab 80 mg  80 mg Oral Nightly    donepezil (Aricept) tab 10 mg  10 mg Oral Nightly    ezetimibe (Zetia) tab 10 mg  10 mg Oral Nightly    levothyroxine (Synthroid) tab 100 mcg  100 mcg Oral Before breakfast    rOPINIRole (Requip) tab 0.5 mg  0.5 mg Oral Nightly    tamsulosin (Flomax) cap 0.4 mg  0.4 mg Oral Nightly    sodium chloride 0.9% infusion   Intravenous Continuous    melatonin tab 3 mg  3 mg Oral Nightly PRN    sennosides (Senokot) tab 8.6 mg  8.6 mg Oral BID    docusate sodium (Colace) cap 100 mg  100 mg Oral BID    polyethylene glycol (PEG 3350) (Miralax) 17 g oral packet 17 g  17 g Oral Daily PRN    bisacodyl (Dulcolax) 10 MG rectal suppository 10 mg  10 mg Rectal Daily PRN    ondansetron (Zofran) 4 MG/2ML injection 4 mg  4 mg Intravenous Q6H PRN    DOBUTamine in dextrose 5% (Dobutrex) 500  mg/250mL infusion premix  2.5-20 mcg/kg/min (Dosing Weight) Intravenous Continuous PRN    nitroGLYCERIN in dextrose 5% 50 mg/250mL infusion premix  5-300 mcg/min Intravenous Continuous PRN    norepinephrine (Levophed) 4 mg/250mL infusion premix  0.5-30 mcg/min Intravenous Continuous PRN    NitroPRUSSide (Nipride) 50 mg in dextrose 5% 250 mL infusion  0.1-4 mcg/kg/min (Dosing Weight) Intravenous Continuous PRN    potassium chloride 20 mEq/100mL IVPB premix 20 mEq  20 mEq Intravenous PRN    Or    potassium chloride 40 mEq/100mL IVPB premix (central line) 40 mEq  40 mEq Intravenous PRN    calcium gluconate 3 g in sodium chloride 0.9% 100 mL IVPB  3 g Intravenous PRN    magnesium sulfate in dextrose 5% 1 g/100mL infusion premix 1 g  1 g Intravenous PRN    magnesium sulfate in sterile water for injection 2 g/50mL IVPB premix 2 g  2 g Intravenous PRN    mupirocin (Bactroban) 2% nasal ointment 1 Application  1 Application Nasal BID    dextrose 5%-sodium chloride 0.45% infusion   mL/hr Intravenous Continuous    morphINE PF 2 MG/ML injection 2 mg  2 mg Intravenous Q2H PRN    Or    morphINE PF 4 MG/ML injection 4 mg  4 mg Intravenous Q2H PRN    aspirin DR tab 81 mg  81 mg Oral Daily    pantoprazole (Protonix) 40 mg in sodium chloride 0.9% PF 10 mL IV push  40 mg Intravenous QAM AC    Or    pantoprazole (Protonix) DR tab 40 mg  40 mg Oral QAM AC         Assessment:  Mitral Valve Regur s/p MVR, maze of KE  A Fib  Hx of CAD s/p PCI  HTN  HLD    Plan:   Continue to progress  Continue amio and eliquis  Stable from cardiology standpoint for discharge      Kimberlee Santos MD  10/12/2024  6:07 AM       [1]   Allergies  Allergen Reactions    Rivaroxaban BLEEDING

## 2024-10-12 NOTE — PLAN OF CARE
Discharge orders received. Patient discharge teaching took place at bedside. All questions answered at bedside. Patient vitals within normal range prior to discharge.PIVS removed and dressing applied on top.  Patient transported via wheel chair to [facility].

## 2024-10-12 NOTE — DISCHARGE PLANNING
Discharge instructions reviewed with patient and wife. Both acknowledged understanding. Tele box and IV removed. Patient transported to [facility], via wheelchair, with all paperwork and belongings.

## 2024-10-12 NOTE — PROGRESS NOTES
[facility]   [facility] Progress Note    Murtaza Hickey Patient Status:  Inpatient    1950 MRN [id number]   Location [facility] 8NE-A Attending Zurdo Porras MD   Hosp Day # 4 PCP MIL BOSTON MD     Subjective:  Resting in bed, feeling good and is ready to go home today.    Objective:  /71 (BP Location: Right arm)   Pulse 70   Temp 98.5 °F (36.9 °C) (Oral)   Resp 20   Ht 170.2 cm (5' 7\")   Wt 86.7 kg (191 lb 3.2 oz)   SpO2 99%   BMI 29.95 kg/m²          Intake/Output:    Intake/Output Summary (Last 24 hours) at 10/12/2024 1306  Last data filed at 10/12/2024 0939  Gross per 24 hour   Intake 580 ml   Output 0 ml   Net 580 ml         Last 3 Weights   10/12/24 0500 86.7 kg (191 lb 3.2 oz)   10/11/24 0900 89.1 kg (196 lb 6.4 oz)   10/11/24 0530 84.4 kg (186 lb 1.6 oz)   10/10/24 0500 82.5 kg (181 lb 14.4 oz)   10/09/24 1820 89.3 kg (196 lb 14.4 oz)   10/09/24 0500 87.9 kg (193 lb 12.6 oz)   10/08/24 0610 82.7 kg (182 lb 6.4 oz)   24 1102 84.8 kg (187 lb)   24 1350 99.8 kg (220 lb)   20 1024 84.4 kg (186 lb)           Allergies:  Allergies[1]    Current Facility-Administered Medications   Medication Dose Route Frequency    metoprolol tartrate (Lopressor) tab 25 mg  25 mg Oral 2x Daily(Beta Blocker)    furosemide (Lasix) 10 mg/mL injection 40 mg  40 mg Intravenous BID (Diuretic)    HYDROcodone-acetaminophen (Norco) 5-325 MG per tab 1 tablet  1 tablet Oral Q4H PRN    Or    HYDROcodone-acetaminophen (Norco) 5-325 MG per tab 2 tablet  2 tablet Oral Q4H PRN    insulin aspart (NovoLOG) 100 Units/mL FlexPen 1-10 Units  1-10 Units Subcutaneous TID AC and HS    benzocaine-menthol (Cepacol) lozenge 1 lozenge  1 lozenge Oral PRN    apixaban (Eliquis) tab 5 mg  5 mg Oral BID    atorvastatin (Lipitor) tab 80 mg  80 mg Oral Nightly    donepezil (Aricept) tab 10 mg  10 mg Oral Nightly    ezetimibe (Zetia) tab 10 mg  10 mg Oral Nightly    levothyroxine (Synthroid) tab 100 mcg  100 mcg  Oral Before breakfast    rOPINIRole (Requip) tab 0.5 mg  0.5 mg Oral Nightly    tamsulosin (Flomax) cap 0.4 mg  0.4 mg Oral Nightly    melatonin tab 3 mg  3 mg Oral Nightly PRN    sennosides (Senokot) tab 8.6 mg  8.6 mg Oral BID    docusate sodium (Colace) cap 100 mg  100 mg Oral BID    polyethylene glycol (PEG 3350) (Miralax) 17 g oral packet 17 g  17 g Oral Daily PRN    bisacodyl (Dulcolax) 10 MG rectal suppository 10 mg  10 mg Rectal Daily PRN    ondansetron (Zofran) 4 MG/2ML injection 4 mg  4 mg Intravenous Q6H PRN    potassium chloride 20 mEq/100mL IVPB premix 20 mEq  20 mEq Intravenous PRN    Or    potassium chloride 40 mEq/100mL IVPB premix (central line) 40 mEq  40 mEq Intravenous PRN    mupirocin (Bactroban) 2% nasal ointment 1 Application  1 Application Nasal BID    morphINE PF 2 MG/ML injection 2 mg  2 mg Intravenous Q2H PRN    Or    morphINE PF 4 MG/ML injection 4 mg  4 mg Intravenous Q2H PRN    aspirin DR tab 81 mg  81 mg Oral Daily    pantoprazole (Protonix) 40 mg in sodium chloride 0.9% PF 10 mL IV push  40 mg Intravenous QAM AC    Or    pantoprazole (Protonix) DR tab 40 mg  40 mg Oral QAM AC       Labs:  Lab Results   Component Value Date    WBC 7.4 10/12/2024    HGB 10.7 10/12/2024    HCT 31.0 10/12/2024    .0 10/12/2024    CREATSERUM 0.76 10/12/2024    BUN 15 10/12/2024     10/12/2024    K 3.9 10/12/2024     10/12/2024    CO2 30.0 10/12/2024     10/12/2024    CA 8.3 10/12/2024       Chest Xray: none today      Physical Exam:    Neuro:Intact, A/O x 3  Lungs: CTA  Heart: RRR, S1, S2  Abdomen: Soft, non tender, +BS/BM  Extremities:Warm, dry SALCEDO, + minimal edema  Pulses: Palpable  Incisions: Sternal C/D/I       Assessment/Plan:  Patient Active Problem List   Diagnosis    Hx of colonic polyps    Diverticulosis of sigmoid colon    Hyperlipidemia    Hypertension, essential    Congestive heart disease (HCC)    Atherosclerosis of coronary artery    Atrial fibrillation (HCC)     Arthritis    History of hemorrhoids    Cardiac pacemaker in situ    Mitral valve insufficiency    Hyperglycemia    Leukocytosis       POD# 4 Mitral valve replacement, modified maze and amputation of left atrial appendage   - HD stable  - neuro intact   - PW are out, pt has PPM   - PAF, restarted Eliquis as at home.  - post op anemia/ TCP, stable/ Monitor  - leukocytosis, WBC trending down. Monitor  - renal function intact, good urine output   - fluid overload, continue lasix PO for 7 days at home   - pain management, Norco    - PPX: TEDs/ SCDs/ Protonix   - Encourage deep breathing, coughing, I.S.   - Increase activity. As able today. PT/OT/ cardiac rehab   - Ok to discharge home today        D/W Dr. Tobias SMITH, RN  10/12/2024  1:06 PM         [1]   Allergies  Allergen Reactions    Rivaroxaban BLEEDING

## 2024-10-12 NOTE — PLAN OF CARE
Patient is a A/OX4. Patient continues to have 4 out of 10 pain at mid sternal site. Pain is being controlled with Norco and Nausea is being controlled with Zofran    Midsternal dressings has steristrips with betadine.                      Problem: Patient/Family Goals  Goal: Patient/Family Long Term Goal  Description: Patient's Long Term Goal: Patient will loose 5lbs of water weight over the next week    Interventions:  - Continue lasix 40mg  - See additional Care Plan goals for specific interventions  Outcome: Progressing     Problem: Patient/Family Goals  Goal: Patient/Family Short Term Goal  Description: Patient's Short Term Goal: Patient's pain level will decreases from 4 to 2    Interventions:   - Continue using Narco when needed  - See additional Care Plan goals for specific interventions  Outcome: Progressing

## 2024-10-12 NOTE — PLAN OF CARE
Assumed care of patient at 1930. Aox4, RA, AV-paced. Reports sternal pain to surgical site, norco given. Up ad fouzia. Reports nausea, zofran given, states it resolved following dose. Reports recent loose BM's (Non-liquid) per pt, declining Senna/colace. QID. Bed locked and in lowest position. Call light and personal items within reach.       -Surgical site to sternum: Steristripes intact, betadine applied. Mild redness and swelling above upper aspect of incision.   --ABD pressure dressing CDI        Problem: Patient/Family Goals  Goal: Patient/Family Long Term Goal  Description: Patient's Long Term Goal: Patient will loose 5lbs of water weight over the next week    Interventions:  - Continue lasix 40mg  - See additional Care Plan goals for specific interventions  Outcome: Progressing  Goal: Patient/Family Short Term Goal  Description: Patient's Short Term Goal: Patient's pain level will decreases from 4 to 2    Interventions:   - Continue using Narco when needed  - See additional Care Plan goals for specific interventions  Outcome: Progressing     Problem: CARDIOVASCULAR - ADULT  Goal: Maintains optimal cardiac output and hemodynamic stability  Description: INTERVENTIONS:  - Monitor vital signs, rhythm, and trends  - Monitor for bleeding, hypotension and signs of decreased cardiac output  - Evaluate effectiveness of vasoactive medications to optimize hemodynamic stability  - Monitor arterial and/or venous puncture sites for bleeding and/or hematoma  - Assess quality of pulses, skin color and temperature  - Assess for signs of decreased coronary artery perfusion - ex. Angina  - Evaluate fluid balance, assess for edema, trend weights  Outcome: Progressing  Goal: Absence of cardiac arrhythmias or at baseline  Description: INTERVENTIONS:  - Continuous cardiac monitoring, monitor vital signs, obtain 12 lead EKG if indicated  - Evaluate effectiveness of antiarrhythmic and heart rate control medications as ordered  -  Initiate emergency measures for life threatening arrhythmias  - Monitor electrolytes and administer replacement therapy as ordered  Outcome: Progressing     Problem: PAIN - ADULT  Goal: Verbalizes/displays adequate comfort level or patient's stated pain goal  Description: INTERVENTIONS:  - Encourage pt to monitor pain and request assistance  - Assess pain using appropriate pain scale  - Administer analgesics based on type and severity of pain and evaluate response  - Implement non-pharmacological measures as appropriate and evaluate response  - Consider cultural and social influences on pain and pain management  - Manage/alleviate anxiety  - Utilize distraction and/or relaxation techniques  - Monitor for opioid side effects  - Notify MD/LIP if interventions unsuccessful or patient reports new pain  - Anticipate increased pain with activity and pre-medicate as appropriate  Outcome: Progressing     Problem: SAFETY ADULT - FALL  Goal: Free from fall injury  Description: INTERVENTIONS:  - Assess pt frequently for physical needs  - Identify cognitive and physical deficits and behaviors that affect risk of falls.  - [place] fall precautions as indicated by assessment.  - Educate pt/family on patient safety including physical limitations  - Instruct pt to call for assistance with activity based on assessment  - Modify environment to reduce risk of injury  - Provide assistive devices as appropriate  - Consider OT/PT consult to assist with strengthening/mobility  - Encourage toileting schedule  Outcome: Progressing

## 2024-10-15 ENCOUNTER — TELEPHONE (OUTPATIENT)
Dept: CARDIOLOGY UNIT | Facility: HOSPITAL | Age: 74
End: 2024-10-15

## 2024-10-15 NOTE — PROGRESS NOTES
Follow Up Phone Call    1. How are you doing now that you are home? No Complaints    2. Have there been any changes in your wound/incision since going home? No    3. Is your pain manageable at home? Yes,     4. Are you following the walking routine given to you in the hospital? Yes,     5. Are you continuing to use your incentive spirometer? Yes,     6. Do you have your appointments for Chest Xray?  Yes,                                                              Primary MD?  Yes,                                                               Cardiologist?  Yes, With Neema Dos Santos on 10/30/24                                                              Dr. Porras? Yes, 11/5/24 10:15 Am                                                              Cardiac Rehab?  Yes, 11/7/24 10 AM    7. Do you have any other questions or concerns today? Yes, He asked: \"After I finish the 7 days of lasix, 40 mg per day, do I go back to my regular dose of 20 mg per day?\" I told him yes. He is doing really well, following the walking program and his incision is healing well he says.        Amy ROTHMAN RN  10/15/2024  12:08 PM

## 2024-10-15 NOTE — DISCHARGE SUMMARY
[facility]  Discharge Summary    Murtaza Hickey Patient Status:  Inpatient    1950 MRN [id number]   Location [facility] 8NE-A Attending No att. providers found   Hosp Day # 4 PCP MIL BOSTON MD     Admit date: 10/8/2024    Discharge date and time: 10/12/2024  2:46 PM     Discharge Diagnoses:   MR, Atrial Fibrillation    Procedures Performed:   MV Replacement, MAZE, Amputation of LA Appendage    HPI & Hospital Course: Murtaza Hickey is a 74 year old year old gentleman with MR and AF who was admitted to the hospital for MV replacement, MAZE and amputation of LA appendage. Surgery was performed and recovery was uneventful. For more detailed information please see the medical record.     Discharge Condition: Stable     Discharge Instructions:  Pt was instructed not to lift anything heavy and to follow up with Dr. Porras as directed.     Signed:  Zurdo Porras MD  10/15/2024

## 2024-10-16 NOTE — CDS QUERY
Dear Doctor Chiquita,    Please further clarify the diagnosis of post op anemia     [   ] Anemia due to postop blood loss   [  x ] Other (please specify): expected post op blood loss      Documentation from the Medical Record    Clinical indicators: 0/8 ED 74 Y/M- Direct admit elective surgery    Preoperative labs: Hgb- 16.1, Hct- 46.7  Repeat Hgb- 10.7, Hct- 31.0    PROCEDURE: Mitral valve replacement with chordal preservation using 33 mm Magna bovine pericardial valve. Modified maze with bipolar radiofrequency including amputation of the left atrial appendage.   EBL- 600ml     Risks: s/p open heart surgery with 600ml EBL    Treatment: monitor daily CBC     Use of terms such as suspected, possible, or probable (associated with a specific diagnosis that is being evaluated, monitored, or treated as if it exists) are acceptable and can be coded in the inpatient setting, when documented at the time of discharge.     Please add any additional documentation to your progress note and continue to document this through discharge.      Clinical : Dasia Daigle -[phone]. Thank You.    THIS FORM IS A PERMANENT PART OF THE MEDICAL RECORD

## 2024-10-21 ENCOUNTER — HOSPITAL ENCOUNTER (OUTPATIENT)
Dept: GENERAL RADIOLOGY | Facility: HOSPITAL | Age: 74
Discharge: HOME | End: 2024-10-21
Attending: THORACIC SURGERY (CARDIOTHORACIC VASCULAR SURGERY)
Payer: MEDICARE

## 2024-10-21 DIAGNOSIS — J90 PLEURAL EFFUSION: ICD-10-CM

## 2024-10-21 PROCEDURE — 71048 X-RAY EXAM CHEST 4+ VIEWS: CPT | Performed by: THORACIC SURGERY (CARDIOTHORACIC VASCULAR SURGERY)

## 2024-10-22 ENCOUNTER — TELEPHONE (OUTPATIENT)
Dept: CARDIOLOGY UNIT | Facility: HOSPITAL | Age: 74
End: 2024-10-22

## 2024-10-22 NOTE — PROGRESS NOTES
Called pt to discuss cxr, pt states he feels great, he's walking 15 mins/day, denies cough or sob, instructed to call with increased fatigue, cough or sob, he understands and agrees, reviewed follow up appts.  Venus Matthews RN  Clinical Coordinator  [facility]

## 2024-11-07 ENCOUNTER — ORDER TRANSCRIPTION (OUTPATIENT)
Dept: CARDIAC REHAB | Facility: HOSPITAL | Age: 74
End: 2024-11-07

## 2024-11-07 ENCOUNTER — CARDPULM VISIT (OUTPATIENT)
Dept: CARDIAC REHAB | Facility: HOSPITAL | Age: 74
End: 2024-11-07
Attending: INTERNAL MEDICINE
Payer: MEDICARE

## 2024-11-07 DIAGNOSIS — Z98.890 S/P MVR (MITRAL VALVE REPAIR): Primary | ICD-10-CM

## 2024-11-08 ENCOUNTER — CARDPULM VISIT (OUTPATIENT)
Dept: CARDIAC REHAB | Facility: HOSPITAL | Age: 74
End: 2024-11-08
Attending: INTERNAL MEDICINE
Payer: MEDICARE

## 2024-11-08 PROCEDURE — 93798 PHYS/QHP OP CAR RHAB W/ECG: CPT

## 2024-11-11 ENCOUNTER — CARDPULM VISIT (OUTPATIENT)
Dept: CARDIAC REHAB | Facility: HOSPITAL | Age: 74
End: 2024-11-11
Attending: INTERNAL MEDICINE
Payer: MEDICARE

## 2024-11-11 PROCEDURE — 93798 PHYS/QHP OP CAR RHAB W/ECG: CPT

## 2024-11-13 ENCOUNTER — CARDPULM VISIT (OUTPATIENT)
Dept: CARDIAC REHAB | Facility: HOSPITAL | Age: 74
End: 2024-11-13
Attending: INTERNAL MEDICINE
Payer: MEDICARE

## 2024-11-13 PROCEDURE — 93798 PHYS/QHP OP CAR RHAB W/ECG: CPT

## 2024-11-15 ENCOUNTER — CARDPULM VISIT (OUTPATIENT)
Dept: CARDIAC REHAB | Facility: HOSPITAL | Age: 74
End: 2024-11-15
Attending: INTERNAL MEDICINE
Payer: MEDICARE

## 2024-11-15 PROCEDURE — 93798 PHYS/QHP OP CAR RHAB W/ECG: CPT

## 2024-11-18 ENCOUNTER — CARDPULM VISIT (OUTPATIENT)
Dept: CARDIAC REHAB | Facility: HOSPITAL | Age: 74
End: 2024-11-18
Attending: INTERNAL MEDICINE
Payer: MEDICARE

## 2024-11-18 PROCEDURE — 93798 PHYS/QHP OP CAR RHAB W/ECG: CPT

## 2024-11-20 ENCOUNTER — CARDPULM VISIT (OUTPATIENT)
Dept: CARDIAC REHAB | Facility: HOSPITAL | Age: 74
End: 2024-11-20
Attending: INTERNAL MEDICINE
Payer: MEDICARE

## 2024-11-20 PROCEDURE — 93798 PHYS/QHP OP CAR RHAB W/ECG: CPT

## 2024-11-22 ENCOUNTER — CARDPULM VISIT (OUTPATIENT)
Dept: CARDIAC REHAB | Facility: HOSPITAL | Age: 74
End: 2024-11-22
Attending: INTERNAL MEDICINE
Payer: MEDICARE

## 2024-11-22 PROCEDURE — 93798 PHYS/QHP OP CAR RHAB W/ECG: CPT

## 2024-11-25 ENCOUNTER — CARDPULM VISIT (OUTPATIENT)
Dept: CARDIAC REHAB | Facility: HOSPITAL | Age: 74
End: 2024-11-25
Attending: INTERNAL MEDICINE
Payer: MEDICARE

## 2024-11-25 PROCEDURE — 93798 PHYS/QHP OP CAR RHAB W/ECG: CPT

## 2024-11-26 ENCOUNTER — HOSPITAL ENCOUNTER (OUTPATIENT)
Dept: CV DIAGNOSTICS | Age: 74
Discharge: HOME | End: 2024-11-26
Attending: INTERNAL MEDICINE
Payer: MEDICARE

## 2024-11-26 DIAGNOSIS — I34.9 NON-RHEUMATIC MITRAL VALVE DISEASE: ICD-10-CM

## 2024-11-26 DIAGNOSIS — I42.9 CARDIOMYOPATHY (HCC): ICD-10-CM

## 2024-11-26 PROCEDURE — 93306 TTE W/DOPPLER COMPLETE: CPT | Performed by: INTERNAL MEDICINE

## 2024-11-27 ENCOUNTER — CARDPULM VISIT (OUTPATIENT)
Dept: CARDIAC REHAB | Facility: HOSPITAL | Age: 74
End: 2024-11-27
Attending: INTERNAL MEDICINE
Payer: MEDICARE

## 2024-11-27 PROCEDURE — 93798 PHYS/QHP OP CAR RHAB W/ECG: CPT

## 2024-11-29 ENCOUNTER — CARDPULM VISIT (OUTPATIENT)
Dept: CARDIAC REHAB | Facility: HOSPITAL | Age: 74
End: 2024-11-29
Attending: INTERNAL MEDICINE
Payer: MEDICARE

## 2024-11-29 PROCEDURE — 93798 PHYS/QHP OP CAR RHAB W/ECG: CPT

## 2024-12-02 ENCOUNTER — CARDPULM VISIT (OUTPATIENT)
Dept: CARDIAC REHAB | Facility: HOSPITAL | Age: 74
End: 2024-12-02
Attending: INTERNAL MEDICINE
Payer: MEDICARE

## 2024-12-02 PROCEDURE — 93798 PHYS/QHP OP CAR RHAB W/ECG: CPT

## 2024-12-04 ENCOUNTER — CARDPULM VISIT (OUTPATIENT)
Dept: CARDIAC REHAB | Facility: HOSPITAL | Age: 74
End: 2024-12-04
Attending: INTERNAL MEDICINE
Payer: MEDICARE

## 2024-12-04 PROCEDURE — 93798 PHYS/QHP OP CAR RHAB W/ECG: CPT

## 2024-12-06 ENCOUNTER — CARDPULM VISIT (OUTPATIENT)
Dept: CARDIAC REHAB | Facility: HOSPITAL | Age: 74
End: 2024-12-06
Attending: INTERNAL MEDICINE
Payer: MEDICARE

## 2024-12-06 PROCEDURE — 93798 PHYS/QHP OP CAR RHAB W/ECG: CPT

## 2024-12-09 ENCOUNTER — CARDPULM VISIT (OUTPATIENT)
Dept: CARDIAC REHAB | Facility: HOSPITAL | Age: 74
End: 2024-12-09
Attending: INTERNAL MEDICINE
Payer: MEDICARE

## 2024-12-09 PROCEDURE — 93798 PHYS/QHP OP CAR RHAB W/ECG: CPT

## 2024-12-11 ENCOUNTER — CARDPULM VISIT (OUTPATIENT)
Dept: CARDIAC REHAB | Facility: HOSPITAL | Age: 74
End: 2024-12-11
Attending: INTERNAL MEDICINE
Payer: MEDICARE

## 2024-12-11 PROCEDURE — 93798 PHYS/QHP OP CAR RHAB W/ECG: CPT

## 2024-12-13 ENCOUNTER — CARDPULM VISIT (OUTPATIENT)
Dept: CARDIAC REHAB | Facility: HOSPITAL | Age: 74
End: 2024-12-13
Attending: INTERNAL MEDICINE
Payer: MEDICARE

## 2024-12-13 PROCEDURE — 93798 PHYS/QHP OP CAR RHAB W/ECG: CPT

## 2024-12-16 ENCOUNTER — CARDPULM VISIT (OUTPATIENT)
Dept: CARDIAC REHAB | Facility: HOSPITAL | Age: 74
End: 2024-12-16
Attending: INTERNAL MEDICINE
Payer: MEDICARE

## 2024-12-16 PROCEDURE — 93798 PHYS/QHP OP CAR RHAB W/ECG: CPT

## 2024-12-18 ENCOUNTER — CARDPULM VISIT (OUTPATIENT)
Dept: CARDIAC REHAB | Facility: HOSPITAL | Age: 74
End: 2024-12-18
Attending: INTERNAL MEDICINE
Payer: MEDICARE

## 2024-12-18 PROCEDURE — 93798 PHYS/QHP OP CAR RHAB W/ECG: CPT

## 2024-12-20 ENCOUNTER — CARDPULM VISIT (OUTPATIENT)
Dept: CARDIAC REHAB | Facility: HOSPITAL | Age: 74
End: 2024-12-20
Attending: INTERNAL MEDICINE
Payer: MEDICARE

## 2024-12-20 PROCEDURE — 93798 PHYS/QHP OP CAR RHAB W/ECG: CPT

## 2024-12-23 ENCOUNTER — CARDPULM VISIT (OUTPATIENT)
Dept: CARDIAC REHAB | Facility: HOSPITAL | Age: 74
End: 2024-12-23
Attending: INTERNAL MEDICINE
Payer: MEDICARE

## 2024-12-23 PROCEDURE — 93798 PHYS/QHP OP CAR RHAB W/ECG: CPT

## 2024-12-27 ENCOUNTER — CARDPULM VISIT (OUTPATIENT)
Dept: CARDIAC REHAB | Facility: HOSPITAL | Age: 74
End: 2024-12-27
Attending: INTERNAL MEDICINE
Payer: MEDICARE

## 2024-12-27 PROCEDURE — 93798 PHYS/QHP OP CAR RHAB W/ECG: CPT

## 2024-12-30 ENCOUNTER — CARDPULM VISIT (OUTPATIENT)
Dept: CARDIAC REHAB | Facility: HOSPITAL | Age: 74
End: 2024-12-30
Attending: INTERNAL MEDICINE
Payer: MEDICARE

## 2024-12-30 PROCEDURE — 93798 PHYS/QHP OP CAR RHAB W/ECG: CPT

## 2025-01-03 ENCOUNTER — CARDPULM VISIT (OUTPATIENT)
Dept: CARDIAC REHAB | Facility: HOSPITAL | Age: 75
End: 2025-01-03
Attending: INTERNAL MEDICINE
Payer: MEDICARE

## 2025-01-03 PROCEDURE — 93798 PHYS/QHP OP CAR RHAB W/ECG: CPT

## 2025-01-06 ENCOUNTER — CARDPULM VISIT (OUTPATIENT)
Dept: CARDIAC REHAB | Facility: HOSPITAL | Age: 75
End: 2025-01-06
Attending: INTERNAL MEDICINE
Payer: MEDICARE

## 2025-01-06 PROCEDURE — 93798 PHYS/QHP OP CAR RHAB W/ECG: CPT

## 2025-01-08 ENCOUNTER — CARDPULM VISIT (OUTPATIENT)
Dept: CARDIAC REHAB | Facility: HOSPITAL | Age: 75
End: 2025-01-08
Attending: INTERNAL MEDICINE
Payer: MEDICARE

## 2025-01-08 PROCEDURE — 93798 PHYS/QHP OP CAR RHAB W/ECG: CPT

## 2025-01-10 ENCOUNTER — CARDPULM VISIT (OUTPATIENT)
Dept: CARDIAC REHAB | Facility: HOSPITAL | Age: 75
End: 2025-01-10
Attending: INTERNAL MEDICINE
Payer: MEDICARE

## 2025-01-10 PROCEDURE — 93798 PHYS/QHP OP CAR RHAB W/ECG: CPT

## 2025-01-13 ENCOUNTER — CARDPULM VISIT (OUTPATIENT)
Dept: CARDIAC REHAB | Facility: HOSPITAL | Age: 75
End: 2025-01-13
Attending: INTERNAL MEDICINE
Payer: MEDICARE

## 2025-01-15 ENCOUNTER — APPOINTMENT (OUTPATIENT)
Dept: CARDIAC REHAB | Facility: HOSPITAL | Age: 75
End: 2025-01-15
Attending: INTERNAL MEDICINE
Payer: MEDICARE

## 2025-01-17 ENCOUNTER — APPOINTMENT (OUTPATIENT)
Dept: CARDIAC REHAB | Facility: HOSPITAL | Age: 75
End: 2025-01-17
Attending: INTERNAL MEDICINE
Payer: MEDICARE

## 2025-01-20 ENCOUNTER — APPOINTMENT (OUTPATIENT)
Age: 75
End: 2025-01-20
Attending: INTERNAL MEDICINE
Payer: MEDICARE

## 2025-01-20 PROCEDURE — 93798 PHYS/QHP OP CAR RHAB W/ECG: CPT

## 2025-01-22 ENCOUNTER — CARDPULM VISIT (OUTPATIENT)
Age: 75
End: 2025-01-22
Attending: INTERNAL MEDICINE
Payer: MEDICARE

## 2025-01-22 PROCEDURE — 93798 PHYS/QHP OP CAR RHAB W/ECG: CPT

## 2025-01-24 ENCOUNTER — CARDPULM VISIT (OUTPATIENT)
Age: 75
End: 2025-01-24
Attending: INTERNAL MEDICINE
Payer: MEDICARE

## 2025-01-24 PROCEDURE — 93798 PHYS/QHP OP CAR RHAB W/ECG: CPT

## 2025-01-27 ENCOUNTER — CARDPULM VISIT (OUTPATIENT)
Age: 75
End: 2025-01-27
Attending: INTERNAL MEDICINE
Payer: MEDICARE

## 2025-01-27 PROCEDURE — 93798 PHYS/QHP OP CAR RHAB W/ECG: CPT

## 2025-01-29 ENCOUNTER — CARDPULM VISIT (OUTPATIENT)
Age: 75
End: 2025-01-29
Attending: INTERNAL MEDICINE
Payer: MEDICARE

## 2025-01-29 PROCEDURE — 93798 PHYS/QHP OP CAR RHAB W/ECG: CPT

## 2025-01-31 ENCOUNTER — CARDPULM VISIT (OUTPATIENT)
Age: 75
End: 2025-01-31
Attending: INTERNAL MEDICINE
Payer: MEDICARE

## 2025-01-31 PROCEDURE — 93798 PHYS/QHP OP CAR RHAB W/ECG: CPT

## 2025-02-03 ENCOUNTER — CARDPULM VISIT (OUTPATIENT)
Age: 75
End: 2025-02-03
Attending: INTERNAL MEDICINE
Payer: MEDICARE

## 2025-02-03 PROCEDURE — 93798 PHYS/QHP OP CAR RHAB W/ECG: CPT

## 2025-02-05 ENCOUNTER — CARDPULM VISIT (OUTPATIENT)
Age: 75
End: 2025-02-05
Attending: INTERNAL MEDICINE
Payer: MEDICARE

## 2025-02-05 PROCEDURE — 93798 PHYS/QHP OP CAR RHAB W/ECG: CPT

## 2025-02-07 ENCOUNTER — CARDPULM VISIT (OUTPATIENT)
Age: 75
End: 2025-02-07
Attending: INTERNAL MEDICINE
Payer: MEDICARE

## 2025-02-07 PROCEDURE — 93798 PHYS/QHP OP CAR RHAB W/ECG: CPT

## 2025-02-10 ENCOUNTER — CARDPULM VISIT (OUTPATIENT)
Age: 75
End: 2025-02-10
Attending: INTERNAL MEDICINE
Payer: MEDICARE

## 2025-02-10 PROCEDURE — 93798 PHYS/QHP OP CAR RHAB W/ECG: CPT

## (undated) DEVICE — CATHETER URETH AD 20FR L16IN YEL RUB RND TIP

## (undated) DEVICE — TRANSFER PACK CONTAINER 600 ML WITH COUPLER. STERILE FLUID PATH: Brand: FENWAL TRANSFER PACK CONTAINER 600 ML WITH COUPLER

## (undated) DEVICE — LACTATED R 1000ML INJ

## (undated) DEVICE — SUT 6 DBL WIRE 14IN CCS-1 NABSRB L49MM 1/2 CI

## (undated) DEVICE — SUT ETHBND XL 2-0 30IN V-5 NABSRB GRN WHT L17

## (undated) DEVICE — CABLE PT L10FT ELECTRD PIN DIA1-2MM VENTRICLE

## (undated) DEVICE — COR-KNOT® QUICK LOAD® SINGLES: Brand: COR-KNOT® QUICK LOAD®

## (undated) DEVICE — GLOVE SUR 7 BIOGEL PI ORTHOPRO PIP BRN PWD F

## (undated) DEVICE — INTENT TO BE USED WITH SUTURE MATERIAL FOR TISSUE CLOSURE: Brand: RICHARD-ALLAN® NEEDLE 1/2 CIRCLE TAPER

## (undated) DEVICE — COTTON TAPE,PRE-CUT,2X WHITE STRANDS: Brand: UMBILICAL TAPE

## (undated) DEVICE — FIXED CORE WIRE GUIDE SAFE-T-J, CURVED: Brand: COOK

## (undated) DEVICE — STERILE POLYISOPRENE POWDER-FREE SURGICAL GLOVES: Brand: PROTEXIS

## (undated) DEVICE — CELL SAVER RESERVOIR

## (undated) DEVICE — OPEN HEART: Brand: MEDLINE INDUSTRIES, INC.

## (undated) DEVICE — 3M™ TEGADERM™ TRANSPARENT FILM DRESSING FRAME STYLE, 1626W, 4 IN X 4-3/4 IN (10 CM X 12 CM), 50/CT 4CT/CASE: Brand: 3M™ TEGADERM™

## (undated) DEVICE — SPONGE 4X4 10PK

## (undated) DEVICE — CATHETER,URETHRAL,REDRUBBER,STERILE,20FR: Brand: MEDLINE

## (undated) DEVICE — SUT POLYDEK 3-0 24IN NABSRB GRN

## (undated) DEVICE — SUT PROL 4-0 36IN SH NABSRB BLU 26MM 1/2 CIR

## (undated) DEVICE — SUT PROL 3-0 36IN SH NABSRB BLU L26MM 1/2 CIR

## (undated) DEVICE — NDL CNTR 40CT FM MAG: Brand: MEDLINE INDUSTRIES, INC.

## (undated) DEVICE — Device: Brand: INTELLICART™

## (undated) DEVICE — 3M™ TEGADERM™ TRANSPARENT FILM DRESSING FRAME STYLE, 1624W, 2-3/8 IN X 2-3/4 IN (6 CM X 7 CM), 100/CT 4CT/CASE: Brand: 3M™ TEGADERM™

## (undated) DEVICE — SOLUTION IRRIG 1000ML 0.9% NACL USP BTL

## (undated) DEVICE — GOWN,SIRUS,FABRIC-REINFORCED,X-LARGE: Brand: MEDLINE

## (undated) DEVICE — SUT POLYDEK 2-0 75CM NABSRB GRN

## (undated) DEVICE — SUT PROL 5-0 36IN V-5 NABSRB BLU L17MM 1/2 CI

## (undated) DEVICE — COR-KNOT MINI® COMBO KITBASE PACKAGE TYPE - KITEACH STERILE PACKAGE KIT CONTAINS (2) SINGLE PATIENT USE COR-KNOT MINI® DEVICES AND (12) COR-KNOT® QUICK LOADS®.: Brand: COR-KNOT MINI®

## (undated) DEVICE — CLAMP ABLAT JAW L65MM L CRV 2 ELECTRD BPLR

## (undated) DEVICE — SUT PERMA- 0 18IN FSL NABSRB BLK L30MM 3/8

## (undated) DEVICE — SUT PROL 3-0 36IN SH NABSRB BLU 26MM 1/2 CIR

## (undated) DEVICE — CABLE SUR L12IN SM CLP LNG DISP

## (undated) NOTE — LETTER
Devin De León M.D., F.A.C.S.  Gelacio Jewell M.D., F.A.C.S.  Viet Ryan M.D.   Kimani Smith M.D., F.A.C.S.  BRANDI Villegas M.D., F.A.C.S.   Nikolay Waller M.D., F.A.C.S.  Leonel Harley M.D.    BRANDI Sethi M.D.   BRANDI Nickerson M.D., M.B.A.  Jeremiah Eugene M.D.  BRANDI Silver M.D., F.A.C.S.  Zurdo Porras M.D., F.A.C.S.   BRANDI Reece M.D., F.A.C.S., F.A.C.C. Dayne Schmidt M.D.  Jasibr Aleman M.D.    RENNY Jameson D.O., F.A.C.S.  Joao Collado M.D.   Koko Kirby M.D.  Meet Araujo M.D.    Murtaza Suarez M.D., F.A.C.S.        Welcome to [facility]    As you contemplate possible surgical treatment, it is very important to us that you understand fully what is being discussed, that all of your questions have been answered, and that your options for treatment have been fully explained.    To that end, on the following page we will ask you some questions to make certain that you understand everything which has been explained to you. Included in this understanding is that there are both surgical and nonsurgical treatments available for you, that you have options regarding where your care is given, and what doctors are involved in your care. Included in these options would, of course, be the option to elect for no treatment whatsoever. We especially want to be sure that you have had a chance to have all of your questions and concerns answered. If there are any issues which have not been adequately addressed, we ask you to bring them forward so that we can thoroughly address them.    A patient who is fully informed and understands their condition and options for treatment, as well as potential adverse effects of treatment, is going to be a patient who receives the most benefit out of care rendered.  Our goal in addition to providing excellent surgical care is to provide the necessary information to you and your family in order to make decisions which are appropriate relative to your own care.    Please take the time necessary to read and answer the questions on the next page. Again, if you have any questions, bring them forward and we will certainly address them.    Sincerely,    [facility]    Date:___________________ _______________________ _______________________       Printed Patient Name  Signature of Patient    Date:___________________ _______________________ _______________________       Printed Witness Name  Signature of Witness    _______________________       Relationship of Witness to Patient        Consent Form Page: 1 of 2  [id number] [facility] * [facility] [address] * [place] [address] * [phone] / [phone] *  Fax [phone] * Billing Fax [phone] Version: 9/9/2024    [facility]  Supplemental Consent Form    A [facility] surgeon has met with me and explained the matter of my illness, and what treatments might be available to improve my condition. As a result of that conversation, I understand the following:    A [facility] surgeon met with me and explained, in detail, the nature of my condition for which surgery is being contemplated. The procedure being performed is:  MITRAL VALVE REPAIR/REPLACEMENT; LEFT ATRIAL APPENDAGE AMPUTATION; POSSIBLE MAZE OR MODIFIED MAZE OF LEFT ATRIUM; POSSIBLE RIGHT SIDE LESIONS     Yes _____ No _____    A [facility] surgeon met with me and explained to me that there are alternatives to surgery which may include no surgery, medical therapy, or interventional treatment, among other options and the risks and benefits of the different treatment options:Yes _____ No _____    A [facility] surgeon has explained to me that if I should desire, he/she is willing to explain my case and the surgical and non-surgical  options to family members:            Yes _____ No _____    A [facility] surgeon has answered all of my questions regarding the topics we have discussed. I have been invited to ask more questions:  Yes _____ No _____    A [facility] surgeon has explained to me that if I seek other options or wish treatment at elsewhere, that his/her office will assist me in making such recommendations:  Yes _____ No _____    A [facility] surgeon has explained to me that death, risk of bleeding, stroke, multi-organ failure, heart attack and/or other complications are risks for the proposed surgical procedure:        Yes _____ No _____    A [facility] surgeon has explained to me that I have the right to cancel or postpone the surgery at any time prior to the start of surgery:    Yes _____ No _____    The nature and options for treatment for my condition has been explained to me, in detail, by a [facility] surgeon and all questions have been answered to my satisfaction. I understand that I am not required to undergo surgery, and further, that if I so desire, I could have surgery accomplished by another surgeon or at another institution. I understand and accept that which has been explained to me. I am able to make my decisions knowingly and willingly based on the data.    Date:___________________ _______________________ _______________________       Printed Patient Name  Signature of Patient    Date:___________________ _______________________ _______________________       Printed Witness Name  Signature of Witness    _______________________   Relationship of Witness to Patient          Consent Form Page: 3 of  2  [id number] [facility] * [facility] [address] * [place] [address] * [phone] / [phone] *  Fax [phone] * Billing Fax [phone] Version: 9/9/2024